# Patient Record
Sex: FEMALE | Race: WHITE | NOT HISPANIC OR LATINO | Employment: UNEMPLOYED | URBAN - METROPOLITAN AREA
[De-identification: names, ages, dates, MRNs, and addresses within clinical notes are randomized per-mention and may not be internally consistent; named-entity substitution may affect disease eponyms.]

---

## 2017-12-20 ENCOUNTER — OFFICE VISIT (OUTPATIENT)
Dept: URGENT CARE | Facility: CLINIC | Age: 27
End: 2017-12-20
Payer: COMMERCIAL

## 2017-12-20 VITALS
BODY MASS INDEX: 21.97 KG/M2 | HEART RATE: 89 BPM | OXYGEN SATURATION: 97 % | DIASTOLIC BLOOD PRESSURE: 74 MMHG | SYSTOLIC BLOOD PRESSURE: 116 MMHG | HEIGHT: 67 IN | RESPIRATION RATE: 18 BRPM | WEIGHT: 140 LBS | TEMPERATURE: 100 F

## 2017-12-20 DIAGNOSIS — J01.10 ACUTE FRONTAL SINUSITIS, RECURRENCE NOT SPECIFIED: ICD-10-CM

## 2017-12-20 DIAGNOSIS — J02.9 ACUTE PHARYNGITIS, UNSPECIFIED ETIOLOGY: ICD-10-CM

## 2017-12-20 DIAGNOSIS — J10.1 INFLUENZA A: ICD-10-CM

## 2017-12-20 DIAGNOSIS — J02.9 SORE THROAT: Primary | ICD-10-CM

## 2017-12-20 LAB
CTP QC/QA: YES
FLUAV AG NPH QL: POSITIVE
FLUBV AG NPH QL: NEGATIVE

## 2017-12-20 PROCEDURE — 87804 INFLUENZA ASSAY W/OPTIC: CPT | Mod: 59,QW,S$GLB, | Performed by: INTERNAL MEDICINE

## 2017-12-20 PROCEDURE — 99203 OFFICE O/P NEW LOW 30 MIN: CPT | Mod: S$GLB,,, | Performed by: INTERNAL MEDICINE

## 2017-12-20 RX ORDER — OSELTAMIVIR PHOSPHATE 75 MG/1
75 CAPSULE ORAL 2 TIMES DAILY
Qty: 10 CAPSULE | Refills: 0 | Status: SHIPPED | OUTPATIENT
Start: 2017-12-20 | End: 2017-12-25

## 2017-12-20 RX ORDER — PREDNISONE 10 MG/1
10 TABLET ORAL DAILY
Qty: 5 TABLET | Refills: 0 | Status: SHIPPED | OUTPATIENT
Start: 2017-12-20 | End: 2017-12-25

## 2017-12-20 RX ORDER — AMOXICILLIN AND CLAVULANATE POTASSIUM 875; 125 MG/1; MG/1
1 TABLET, FILM COATED ORAL EVERY 12 HOURS
Qty: 14 TABLET | Refills: 0 | Status: SHIPPED | OUTPATIENT
Start: 2017-12-20 | End: 2017-12-27

## 2017-12-21 NOTE — PROGRESS NOTES
"Subjective:       Patient ID: Isaias Anguiano is a 27 y.o. female.    Vitals:  height is 5' 7" (1.702 m) and weight is 63.5 kg (140 lb). Her oral temperature is 100.2 °F (37.9 °C). Her blood pressure is 116/74 and her pulse is 89. Her respiration is 18 and oxygen saturation is 97%.     Chief Complaint: Sore Throat    This is a 27 y.o. female with No past medical history on file.   who presents today with a chief complaint of Sore Throat. Patient was exposed to Flu.      Sore Throat    This is a new problem. The current episode started yesterday. The problem has been gradually worsening. The maximum temperature recorded prior to her arrival was 100.4 - 100.9 F. The pain is mild. Pertinent negatives include no abdominal pain, congestion, coughing, ear pain, headaches, hoarse voice or shortness of breath. She has tried nothing for the symptoms.     Review of Systems   Constitution: Positive for malaise/fatigue. Negative for chills and fever.   HENT: Positive for sore throat. Negative for congestion, ear pain and hoarse voice.    Eyes: Negative for discharge and redness.   Cardiovascular: Negative for chest pain, dyspnea on exertion and leg swelling.   Respiratory: Negative for cough, shortness of breath, sputum production and wheezing.    Musculoskeletal: Negative for myalgias.   Gastrointestinal: Negative for abdominal pain and nausea.   Neurological: Negative for headaches.       Objective:      Physical Exam   Constitutional: She is oriented to person, place, and time. She appears well-developed and well-nourished. She is cooperative.  Non-toxic appearance. She does not appear ill. No distress.   HENT:   Head: Normocephalic and atraumatic.   Right Ear: Hearing, external ear and ear canal normal. Tympanic membrane is injected.   Left Ear: Hearing, external ear and ear canal normal. Tympanic membrane is injected.   Nose: Mucosal edema and rhinorrhea present. No nasal deformity. No epistaxis. Right sinus exhibits frontal " sinus tenderness. Right sinus exhibits no maxillary sinus tenderness. Left sinus exhibits frontal sinus tenderness. Left sinus exhibits no maxillary sinus tenderness.   Mouth/Throat: Uvula is midline and mucous membranes are normal. No trismus in the jaw. Normal dentition. No uvula swelling. Posterior oropharyngeal erythema present.       Eyes: Conjunctivae and lids are normal. No scleral icterus.   Sclera clear bilat   Neck: Trachea normal, full passive range of motion without pain and phonation normal. Neck supple.   Cardiovascular: Normal rate, regular rhythm, normal heart sounds, intact distal pulses and normal pulses.    Pulmonary/Chest: Effort normal and breath sounds normal. No respiratory distress.   Abdominal: Soft. Normal appearance and bowel sounds are normal. She exhibits no distension. There is no tenderness.   Musculoskeletal: Normal range of motion. She exhibits no edema or deformity.   Neurological: She is alert and oriented to person, place, and time. She exhibits normal muscle tone. Coordination normal.   Skin: Skin is warm, dry and intact. She is not diaphoretic. No pallor.   Psychiatric: She has a normal mood and affect. Her speech is normal and behavior is normal. Judgment and thought content normal. Cognition and memory are normal.   Nursing note and vitals reviewed.      Assessment:       1. Sore throat    2. Acute frontal sinusitis, recurrence not specified    3. Acute pharyngitis, unspecified etiology        Plan:         Sore throat  -     POCT Influenza A/B  -     amoxicillin-clavulanate 875-125mg (AUGMENTIN) 875-125 mg per tablet; Take 1 tablet by mouth every 12 (twelve) hours.  Dispense: 14 tablet; Refill: 0  -     predniSONE (DELTASONE) 10 MG tablet; Take 1 tablet (10 mg total) by mouth once daily.  Dispense: 5 tablet; Refill: 0  -     oseltamivir (TAMIFLU) 75 MG capsule; Take 1 capsule (75 mg total) by mouth 2 (two) times daily.  Dispense: 10 capsule; Refill: 0    Acute frontal  sinusitis, recurrence not specified  -     amoxicillin-clavulanate 875-125mg (AUGMENTIN) 875-125 mg per tablet; Take 1 tablet by mouth every 12 (twelve) hours.  Dispense: 14 tablet; Refill: 0  -     predniSONE (DELTASONE) 10 MG tablet; Take 1 tablet (10 mg total) by mouth once daily.  Dispense: 5 tablet; Refill: 0  -     oseltamivir (TAMIFLU) 75 MG capsule; Take 1 capsule (75 mg total) by mouth 2 (two) times daily.  Dispense: 10 capsule; Refill: 0    Acute pharyngitis, unspecified etiology  -     amoxicillin-clavulanate 875-125mg (AUGMENTIN) 875-125 mg per tablet; Take 1 tablet by mouth every 12 (twelve) hours.  Dispense: 14 tablet; Refill: 0  -     predniSONE (DELTASONE) 10 MG tablet; Take 1 tablet (10 mg total) by mouth once daily.  Dispense: 5 tablet; Refill: 0  -     oseltamivir (TAMIFLU) 75 MG capsule; Take 1 capsule (75 mg total) by mouth 2 (two) times daily.  Dispense: 10 capsule; Refill: 0      Take meds

## 2017-12-21 NOTE — PATIENT INSTRUCTIONS
Acute Bacterial Rhinosinusitis (ABRS)    Acute bacterial rhinosinusitis (ABRS) is an infection of your nasal cavity and sinuses. Its caused by bacteria. Acute means that youve had symptoms for less than 12 weeks.  Understanding your sinuses  The nasal cavity is the large air-filled space behind your nose. The sinuses are a group of spaces formed by the bones of your face. They connect with your nasal cavity. ABRS causes the tissue lining these spaces to become inflamed. Mucus may not drain normally. This leads to facial pain and other symptoms.  What causes ABRS?  ABRS most often follows an upper respiratory infection caused by a virus. Bacteria then infect the lining of your nasal cavity and sinuses. But you can also get ABRS if you have:  · Nasal allergies  · Long-term nasal swelling and congestion not caused by allergies  · Blockage in the nose  Symptoms of ABRS  The symptoms of ABRS may be different for each person, and can include:  · Nasal congestion  · Runny nose  · Fluid draining from the nose down the throat (postnasal drip)  · Headache  · Cough  · Pain in the sinuses  · Thick, colored fluid from the nose (mucus)  · Fever  Diagnosing ABRS  ABRS may be diagnosed if youve had an upper respiratory infection like a cold and cough for longer than 10 to 14 days. Your health care provider will ask about your symptoms and your medical history. The provider will check your vital signs, including your temperature. Youll have a physical exam. The health care provider will check your ears, nose, and throat. You likely wont need any tests. If ABRS comes back, you may have a culture or other tests.  Treatment for ABRS  Treatment may include:  · Antibiotic medicine. This is for symptoms that last for at least 10 to 14 days.  · Nasal corticosteroid medicine. Drops or spray used in the nose can lessen swelling and congestion.  · Over-the-counter pain medicine. This is to lessen sinus pain and pressure.  · Nasal  decongestant medicine. Spray or drops may help to lessen congestion. Do not use them for more than a few days.  · Salt wash (saline irrigation). This can help to loosen mucus.  Possible complications of ABRS  ABRS may come back or become long-term (chronic).  In rare cases, ABRS may cause complications such as:   · Inflamed tissue around the brain and spinal cord (meningitis)  · Inflamed tissue around the eyes (orbital cellulitis)  · Inflamed bones around the sinuses (osteitis)  These problems may need to be treated in a hospital with intravenous (IV) antibiotic medicine or surgery.  When to call the health care provider  Call your health care provider if you have any of the following:  · Symptoms that dont get better, or get worse  · Symptoms that dont get better after 3 to 5 days on antibiotics  · Trouble seeing  · Swelling around your eyes  · Confusion or trouble staying awake   Date Last Reviewed: 3/3/2015  © 7518-6900 [employer]. [address] [place] [address]. All rights reserved. This information is not intended as a substitute for professional medical care. Always follow your healthcare professional's instructions.        Influenza (Adult)    Influenza is also called the flu. It is a viral illness that affects the air passages of your lungs. It is different from the common cold. The flu can easily be passed from one to person to another. It may be spread through the air by coughing and sneezing. Or it can be spread by touching the sick person and then touching your own eyes, nose, or mouth.  The flu starts 1 to 3 days after you are exposed to the flu virus. It may last for 1 to 2 weeks but many people feel tired or fatigued for many weeks afterward. You usually dont need to take antibiotics unless you have a complication. This might be an ear or sinus infection or pneumonia.  Symptoms of the flu may be mild or severe. They can include extreme tiredness (wanting to stay in bed all  day), chills, fevers, muscle aches, soreness with eye movement, headache, and a dry, hacking cough.  Home care  Follow these guidelines when caring for yourself at home:  · Avoid being around cigarette smoke, whether yours or other peoples.  · Acetaminophen or ibuprofen will help ease your fever, muscle aches, and headache. Dont give aspirin to anyone younger than 18 who has the flu. Aspirin can harm the liver.  · Nausea and loss of appetite are common with the flu. Eat light meals. Drink 6 to 8 glasses of liquids every day. Good choices are water, sport drinks, soft drinks without caffeine, juices, tea, and soup. Extra fluids will also help loosen secretions in your nose and lungs.  · Over-the-counter cold medicines will not make the flu go away faster. But the medicines may help with coughing, sore throat, and congestion in your nose and sinuses. Dont use a decongestant if you have high blood pressure.  · Stay home until your fever has been gone for at least 24 hours without using medicine to reduce fever.  Follow-up care  Follow up with your healthcare provider, or as advised, if you are not getting better over the next week.  If you are age 65 or older, talk with your provider about getting a pneumococcal vaccine every 5 years. You should also get this vaccine if you have chronic asthma or COPD. All adults should get a flu vaccine every fall. Ask your provider about this.  When to seek medical advice  Call your healthcare provider right away if any of these occur:  · Cough with lots of colored mucus (sputum) or blood in your mucus  · Chest pain, shortness of breath, wheezing, or trouble breathing  · Severe headache, or face, neck, or ear pain  · New rash with fever  · Fever of 100.4°F (38°C) or higher, or as directed by your healthcare provider  · Confusion, behavior change, or seizure  · Severe weakness or dizziness  · You get a new fever or cough after getting better for a few days  Date Last Reviewed:  1/1/2017 © 2000-2017 [employer]. [address] [place] [address], [place] [address]. All rights reserved. This information is not intended as a substitute for professional medical care. Always follow your healthcare professional's instructions.    Please return here or go to the Emergency Department for any concerns or worsening of condition.  If you were prescribed antibiotics, please take them to completion.  If you were prescribed a narcotic medication, do not drive or operate heavy equipment or machinery while taking these medications.  Please follow up with your primary care doctor or specialist as needed.    If you  smoke, please stop smoking.  1) Motrin/advil/ibuprofen- Take Two to Three Tablets(200 mg) three Times a Day for 5 to 7 Days.  2) Mucinex D 1/2 to 1 Tablet twice a day for 5 to 7 Days.  3) Drink Hot Liquids(coffee,WATER,Tea,Hot Chocolate,or Soup) that you put in a Mug place in Microwave for 2.5 to 3 minutes CHANGE THE CUP THAT WAS USED IN THE MICROWAVE SO AS NOT TO BURN YOUR MOUTH,then sniff the steam from the cup and sip the heated liquid TEN TO TWELVE TIMES A DAY for 5 to 7 Days.  4) These 3 things will help the antibiotics and other medications work faster and will speed your recovery!

## 2017-12-23 ENCOUNTER — TELEPHONE (OUTPATIENT)
Dept: URGENT CARE | Facility: CLINIC | Age: 27
End: 2017-12-23

## 2018-10-08 ENCOUNTER — OFFICE VISIT (OUTPATIENT)
Dept: URGENT CARE | Facility: CLINIC | Age: 28
End: 2018-10-08
Payer: COMMERCIAL

## 2018-10-08 VITALS
TEMPERATURE: 97 F | WEIGHT: 135 LBS | OXYGEN SATURATION: 96 % | HEART RATE: 82 BPM | SYSTOLIC BLOOD PRESSURE: 116 MMHG | HEIGHT: 67 IN | BODY MASS INDEX: 21.19 KG/M2 | RESPIRATION RATE: 16 BRPM | DIASTOLIC BLOOD PRESSURE: 75 MMHG

## 2018-10-08 DIAGNOSIS — J01.10 ACUTE NON-RECURRENT FRONTAL SINUSITIS: Primary | ICD-10-CM

## 2018-10-08 PROCEDURE — 3008F BODY MASS INDEX DOCD: CPT | Mod: CPTII,S$GLB,, | Performed by: INTERNAL MEDICINE

## 2018-10-08 PROCEDURE — 96372 THER/PROPH/DIAG INJ SC/IM: CPT | Mod: S$GLB,,, | Performed by: NURSE PRACTITIONER

## 2018-10-08 PROCEDURE — 99213 OFFICE O/P EST LOW 20 MIN: CPT | Mod: 25,S$GLB,, | Performed by: INTERNAL MEDICINE

## 2018-10-08 RX ORDER — AMOXICILLIN AND CLAVULANATE POTASSIUM 875; 125 MG/1; MG/1
1 TABLET, FILM COATED ORAL EVERY 12 HOURS
Qty: 10 TABLET | Refills: 0 | Status: SHIPPED | OUTPATIENT
Start: 2018-10-08 | End: 2018-10-13

## 2018-10-08 RX ORDER — BETAMETHASONE SODIUM PHOSPHATE AND BETAMETHASONE ACETATE 3; 3 MG/ML; MG/ML
6 INJECTION, SUSPENSION INTRA-ARTICULAR; INTRALESIONAL; INTRAMUSCULAR; SOFT TISSUE
Status: COMPLETED | OUTPATIENT
Start: 2018-10-08 | End: 2018-10-08

## 2018-10-08 RX ADMIN — BETAMETHASONE SODIUM PHOSPHATE AND BETAMETHASONE ACETATE 6 MG: 3; 3 INJECTION, SUSPENSION INTRA-ARTICULAR; INTRALESIONAL; INTRAMUSCULAR; SOFT TISSUE at 09:10

## 2018-10-08 NOTE — PROGRESS NOTES
"Subjective:       Patient ID: Isaias Anguiano is a 28 y.o. female.    Vitals:  height is 5' 7" (1.702 m) and weight is 61.2 kg (135 lb). Her oral temperature is 97.4 °F (36.3 °C). Her blood pressure is 116/75 and her pulse is 82. Her respiration is 16 and oxygen saturation is 96%.     Chief Complaint: Cough    Cough   This is a new problem. The current episode started in the past 7 days. The problem has been gradually worsening. The problem occurs constantly. The cough is productive of purulent sputum. Associated symptoms include postnasal drip and a sore throat. Pertinent negatives include no chest pain, chills, ear pain, eye redness, fever, headaches, myalgias, shortness of breath or wheezing. Nothing aggravates the symptoms. She has tried OTC cough suppressant for the symptoms. The treatment provided no relief.     Review of Systems   Constitution: Negative for chills, fever and malaise/fatigue.   HENT: Positive for postnasal drip and sore throat. Negative for congestion, ear pain and hoarse voice.    Eyes: Negative for discharge and redness.   Cardiovascular: Negative for chest pain, dyspnea on exertion and leg swelling.   Respiratory: Positive for cough and sputum production. Negative for shortness of breath and wheezing.    Musculoskeletal: Negative for myalgias.   Gastrointestinal: Negative for abdominal pain and nausea.   Neurological: Negative for headaches.       Objective:      Physical Exam   Constitutional: She is oriented to person, place, and time. She appears well-developed and well-nourished.   HENT:   Head: Normocephalic and atraumatic.   Right Ear: External ear normal.   Left Ear: External ear normal.   Nose: Nose normal.   Mouth/Throat: Oropharyngeal exudate present.   Thick pnd   Cardiovascular: Normal rate, regular rhythm and normal heart sounds.   Pulmonary/Chest: Effort normal and breath sounds normal.   Abdominal: Soft. Bowel sounds are normal. There is no tenderness.   Musculoskeletal: She " exhibits no edema.   Neurological: She is alert and oriented to person, place, and time.   Skin: Skin is warm and dry.   Psychiatric: She has a normal mood and affect. Her behavior is normal. Judgment and thought content normal.   Nursing note and vitals reviewed.      Assessment:       1. Acute non-recurrent frontal sinusitis        Plan:       1. Acute non-recurrent frontal sinusitis    - betamethasone acetate-betamethasone sodium phosphate injection 6 mg; Inject 1 mL (6 mg total) into the muscle one time.  - amoxicillin-clavulanate 875-125mg (AUGMENTIN) 875-125 mg per tablet; Take 1 tablet by mouth every 12 (twelve) hours. for 5 days  Dispense: 10 tablet; Refill: 0

## 2018-10-08 NOTE — PATIENT INSTRUCTIONS
Acute Sinusitis    Acute sinusitis is irritation and swelling of the sinuses. It is usually caused by a viral infection after a common cold. Your doctor can help you find relief.  What is acute sinusitis?  Sinuses are air-filled spaces in the skull behind the face. They are kept moist and clean by a lining of mucosa. Things such as pollen, smoke, and chemical fumes can irritate the mucosa. It can then swell up. As a response to irritation, the mucosa makes more mucus and other fluids. Tiny hairlike cilia cover the mucosa. Cilia help carry mucus toward the opening of the sinus. Too much mucus may cause the cilia to stop working. This blocks the sinus opening. A buildup of fluid in the sinuses then causes pain and pressure. It can also encourage bacteria to grow in the sinuses.  Common symptoms of acute sinusitis  You may have:  · Facial soreness pain  · Headache  · Fever  · Fluid draining in the back of the throat (postnasal drip)  · Congestion  · Drainage that is thick and colored, instead of clear  · Cough  Diagnosing acute sinusitis  Your doctor will ask about your symptoms and health history. He or she will look at your ear, nose, and throat. You usually won't need to have X-rays taken.    The doctor may take a sample of mucus to check for bacteria. If you have sinusitis that keeps coming back, you may need imaging tests such as X-rays or CAT scans. This will help your doctor check for a structural problem that may be causing the infection.  Treating acute sinusitis  Treatment is aimed at unblocking the sinus opening and helping the cilia work again. You may need to take antihistamine and decongestant medicine. These can reduce inflammation and decrease the amount of fluid your sinuses make. If you have a bacterial infection, you will need to take antibiotic medicine for 10 to 14 days. Take this medicine until it is gone, even if you feel better.  Date Last Reviewed: 10/1/2016  © 3788-0881 [employer] [address] [place] [address]. All rights reserved. This information is not intended as a substitute for professional medical care. Always follow your healthcare professional's instructions.

## 2019-02-05 ENCOUNTER — OFFICE VISIT (OUTPATIENT)
Dept: URGENT CARE | Facility: CLINIC | Age: 29
End: 2019-02-05
Payer: COMMERCIAL

## 2019-02-05 VITALS
WEIGHT: 150 LBS | RESPIRATION RATE: 16 BRPM | SYSTOLIC BLOOD PRESSURE: 102 MMHG | BODY MASS INDEX: 23.54 KG/M2 | OXYGEN SATURATION: 98 % | TEMPERATURE: 99 F | HEIGHT: 67 IN | HEART RATE: 87 BPM | DIASTOLIC BLOOD PRESSURE: 70 MMHG

## 2019-02-05 DIAGNOSIS — Z3A.18 18 WEEKS GESTATION OF PREGNANCY: Primary | ICD-10-CM

## 2019-02-05 DIAGNOSIS — J01.10 ACUTE NON-RECURRENT FRONTAL SINUSITIS: ICD-10-CM

## 2019-02-05 PROCEDURE — 3008F BODY MASS INDEX DOCD: CPT | Mod: CPTII,S$GLB,, | Performed by: NURSE PRACTITIONER

## 2019-02-05 PROCEDURE — 99213 PR OFFICE/OUTPT VISIT, EST, LEVL III, 20-29 MIN: ICD-10-PCS | Mod: S$GLB,,, | Performed by: NURSE PRACTITIONER

## 2019-02-05 PROCEDURE — 3008F PR BODY MASS INDEX (BMI) DOCUMENTED: ICD-10-PCS | Mod: CPTII,S$GLB,, | Performed by: NURSE PRACTITIONER

## 2019-02-05 PROCEDURE — 99213 OFFICE O/P EST LOW 20 MIN: CPT | Mod: S$GLB,,, | Performed by: NURSE PRACTITIONER

## 2019-02-05 RX ORDER — AZITHROMYCIN 250 MG/1
TABLET, FILM COATED ORAL
Qty: 6 TABLET | Refills: 0 | Status: SHIPPED | OUTPATIENT
Start: 2019-02-05 | End: 2019-06-03 | Stop reason: ALTCHOICE

## 2019-02-05 NOTE — PROGRESS NOTES
"Subjective:       Patient ID: Isaias Anguiano is a 28 y.o. female.    Vitals:  height is 5' 7" (1.702 m) and weight is 68 kg (150 lb). Her tympanic temperature is 99.1 °F (37.3 °C). Her blood pressure is 102/70 and her pulse is 87. Her respiration is 16 and oxygen saturation is 98%.     Chief Complaint: Cough    Cough   This is a new problem. The current episode started in the past 7 days. The problem has been gradually worsening. The problem occurs constantly. Associated symptoms include postnasal drip. Pertinent negatives include no chills, ear pain, eye redness, fever, headaches, myalgias, rash or sore throat. Nothing aggravates the symptoms. She has tried OTC cough suppressant for the symptoms. The treatment provided no relief.       Constitution: Negative for appetite change, chills and fever.   HENT: Positive for postnasal drip. Negative for ear pain, congestion and sore throat.    Neck: Negative for painful lymph nodes.   Eyes: Negative for eye discharge and eye redness.   Respiratory: Positive for cough.    Gastrointestinal: Negative for vomiting and diarrhea.   Genitourinary: Negative for dysuria.   Musculoskeletal: Negative for muscle ache.   Skin: Negative for rash.   Neurological: Negative for headaches and seizures.   Hematologic/Lymphatic: Negative for swollen lymph nodes.       Objective:      Physical Exam   Constitutional: She is oriented to person, place, and time. She appears well-developed and well-nourished.   HENT:   Head: Normocephalic and atraumatic.   Right Ear: External ear normal.   Left Ear: External ear normal.   Nose: Nose normal.   Mouth/Throat: Oropharyngeal exudate present.   Cardiovascular: Normal rate, regular rhythm and normal heart sounds.   Pulmonary/Chest: Effort normal and breath sounds normal.   Abdominal: Soft. Bowel sounds are normal. There is no tenderness.   Musculoskeletal: She exhibits no edema.   Neurological: She is alert and oriented to person, place, and time.   Skin: " Skin is warm and dry.   Psychiatric: She has a normal mood and affect. Her behavior is normal. Judgment and thought content normal.   Nursing note and vitals reviewed.      Assessment:       1. 18 weeks gestation of pregnancy    2. Acute non-recurrent frontal sinusitis        Plan:         1. 18 weeks gestation of pregnancy      2. Acute non-recurrent frontal sinusitis  Advised on nasal saline and tylenol.   - azithromycin (Z-MAYO) 250 MG tablet; Take 2 tablets by mouth x 1 for day 1 Then take 1 tablet by mouth daily for day 2 - 5  Dispense: 6 tablet; Refill: 0

## 2019-02-05 NOTE — PATIENT INSTRUCTIONS
Saline saline.     Sinusitis (Antibiotic Treatment)    The sinuses are air-filled spaces within the bones of the face. They connect to the inside of the nose. Sinusitis is an inflammation of the tissue lining the sinus cavity. Sinus inflammation can occur during a cold. It can also be due to allergies to pollens and other particles in the air. Sinusitis can cause symptoms of sinus congestion and fullness. A sinus infection causes fever, headache and facial pain. There is often green or yellow drainage from the nose or into the back of the throat (post-nasal drip). You have been given antibiotics to treat this condition.  Home care:  · Take the full course of antibiotics as instructed. Do not stop taking them, even if you feel better.  · Drink plenty of water, hot tea, and other liquids. This may help thin mucus. It also may promote sinus drainage.  · Heat may help soothe painful areas of the face. Use a towel soaked in hot water. Or,  the shower and direct the hot spray onto your face. Using a vaporizer along with a menthol rub at night may also help.   · An expectorant containing guaifenesin may help thin the mucus and promote drainage from the sinuses.  · Over-the-counter decongestants may be used unless a similar medicine was prescribed. Nasal sprays work the fastest. Use one that contains phenylephrine or oxymetazoline. First blow the nose gently. Then use the spray. Do not use these medicines more often than directed on the label or symptoms may get worse. You may also use tablets containing pseudoephedrine. Avoid products that combine ingredients, because side effects may be increased. Read labels. You can also ask the pharmacist for help. (NOTE: Persons with high blood pressure should not use decongestants. They can raise blood pressure.)  · Over-the-counter antihistamines may help if allergies contributed to your sinusitis.    · Do not use nasal rinses or irrigation during an acute sinus infection,  unless told to by your health care provider. Rinsing may spread the infection to other sinuses.  · Use acetaminophen or ibuprofen to control pain, unless another pain medicine was prescribed. (If you have chronic liver or kidney disease or ever had a stomach ulcer, talk with your doctor before using these medicines. Aspirin should never be used in anyone under 18 years of age who is ill with a fever. It may cause severe liver damage.)  · Don't smoke. This can worsen symptoms.  Follow-up care  Follow up with your healthcare provider or our staff if you are not improving within the next week.  When to seek medical advice  Call your healthcare provider if any of these occur:  · Facial pain or headache becoming more severe  · Stiff neck  · Unusual drowsiness or confusion  · Swelling of the forehead or eyelids  · Vision problems, including blurred or double vision  · Fever of 100.4ºF (38ºC) or higher, or as directed by your healthcare provider  · Seizure  · Breathing problems  · Symptoms not resolving within 10 days  Date Last Reviewed: 4/13/2015  © 6395-2317 [employer]. [address] [place] [address], [place] [address]. All rights reserved. This information is not intended as a substitute for professional medical care. Always follow your healthcare professional's instructions.

## 2019-02-08 ENCOUNTER — TELEPHONE (OUTPATIENT)
Dept: URGENT CARE | Facility: CLINIC | Age: 29
End: 2019-02-08

## 2019-06-03 ENCOUNTER — OFFICE VISIT (OUTPATIENT)
Dept: URGENT CARE | Facility: CLINIC | Age: 29
End: 2019-06-03
Payer: COMMERCIAL

## 2019-06-03 VITALS
DIASTOLIC BLOOD PRESSURE: 65 MMHG | OXYGEN SATURATION: 98 % | BODY MASS INDEX: 27.47 KG/M2 | HEART RATE: 91 BPM | SYSTOLIC BLOOD PRESSURE: 110 MMHG | WEIGHT: 175 LBS | RESPIRATION RATE: 16 BRPM | HEIGHT: 67 IN | TEMPERATURE: 98 F

## 2019-06-03 DIAGNOSIS — J01.10 ACUTE NON-RECURRENT FRONTAL SINUSITIS: Primary | ICD-10-CM

## 2019-06-03 DIAGNOSIS — Z3A.36 36 WEEKS GESTATION OF PREGNANCY: ICD-10-CM

## 2019-06-03 PROCEDURE — 3008F BODY MASS INDEX DOCD: CPT | Mod: CPTII,S$GLB,, | Performed by: NURSE PRACTITIONER

## 2019-06-03 PROCEDURE — 96372 THER/PROPH/DIAG INJ SC/IM: CPT | Mod: S$GLB,,, | Performed by: NURSE PRACTITIONER

## 2019-06-03 PROCEDURE — 96372 PR INJECTION,THERAP/PROPH/DIAG2ST, IM OR SUBCUT: ICD-10-PCS | Mod: S$GLB,,, | Performed by: NURSE PRACTITIONER

## 2019-06-03 PROCEDURE — 3008F PR BODY MASS INDEX (BMI) DOCUMENTED: ICD-10-PCS | Mod: CPTII,S$GLB,, | Performed by: NURSE PRACTITIONER

## 2019-06-03 PROCEDURE — 99213 PR OFFICE/OUTPT VISIT, EST, LEVL III, 20-29 MIN: ICD-10-PCS | Mod: 25,S$GLB,, | Performed by: NURSE PRACTITIONER

## 2019-06-03 PROCEDURE — 99213 OFFICE O/P EST LOW 20 MIN: CPT | Mod: 25,S$GLB,, | Performed by: NURSE PRACTITIONER

## 2019-06-03 RX ORDER — DEXAMETHASONE SODIUM PHOSPHATE 100 MG/10ML
5 INJECTION INTRAMUSCULAR; INTRAVENOUS
Status: COMPLETED | OUTPATIENT
Start: 2019-06-03 | End: 2019-06-03

## 2019-06-03 RX ORDER — AZITHROMYCIN 250 MG/1
TABLET, FILM COATED ORAL
Qty: 6 TABLET | Refills: 0 | Status: SHIPPED | OUTPATIENT
Start: 2019-06-03 | End: 2019-08-26 | Stop reason: ALTCHOICE

## 2019-06-03 RX ADMIN — DEXAMETHASONE SODIUM PHOSPHATE 5 MG: 100 INJECTION INTRAMUSCULAR; INTRAVENOUS at 10:06

## 2019-06-03 NOTE — PROGRESS NOTES
"Subjective:       Patient ID: Isaias Anguiano is a 29 y.o. female.    Vitals:  height is 5' 7" (1.702 m) and weight is 79.4 kg (175 lb). Her tympanic temperature is 98.2 °F (36.8 °C). Her blood pressure is 110/65 and her pulse is 91. Her respiration is 16 and oxygen saturation is 98%.     Chief Complaint: Cough    Cough   This is a new problem. The current episode started in the past 7 days. The problem has been gradually worsening. The cough is productive of purulent sputum. Associated symptoms include postnasal drip and a sore throat. Pertinent negatives include no chills, ear pain, eye redness, fever, hemoptysis, myalgias, rash, shortness of breath or wheezing. Nothing aggravates the symptoms. She has tried OTC cough suppressant for the symptoms. The treatment provided no relief.       Constitution: Negative for chills, sweating, fatigue and fever.   HENT: Positive for postnasal drip, sinus pain, sinus pressure and sore throat. Negative for ear pain, congestion and voice change.    Neck: Negative for painful lymph nodes.   Eyes: Negative for eye redness.   Respiratory: Positive for cough and sputum production. Negative for chest tightness, bloody sputum, COPD, shortness of breath, stridor, wheezing and asthma.    Gastrointestinal: Negative for nausea and vomiting.   Musculoskeletal: Negative for muscle ache.   Skin: Negative for rash.   Allergic/Immunologic: Negative for seasonal allergies and asthma.   Hematologic/Lymphatic: Negative for swollen lymph nodes.       Objective:      Physical Exam   Constitutional: She is oriented to person, place, and time. She appears well-developed and well-nourished.   HENT:   Head: Normocephalic and atraumatic.   Right Ear: External ear normal.   Left Ear: External ear normal.   Nose: Nose normal.   Mouth/Throat: Oropharyngeal exudate present.   Cardiovascular: Normal rate, regular rhythm and normal heart sounds.   Pulmonary/Chest: Effort normal and breath sounds normal.   Hacking " cough   Abdominal: Soft. Bowel sounds are normal. There is no tenderness.   Musculoskeletal: She exhibits no edema.   Neurological: She is alert and oriented to person, place, and time.   Skin: Skin is warm and dry.   Psychiatric: She has a normal mood and affect. Her behavior is normal. Judgment and thought content normal.   Nursing note and vitals reviewed.      Assessment:       1. Acute non-recurrent frontal sinusitis    2. 36 weeks gestation of pregnancy        Plan:         1. Acute non-recurrent frontal sinusitis/pregnancy 36 weeks.   Advised on risk vs benefit. She request shot, will hold a/b until end of week.   - azithromycin (Z-MAYO) 250 MG tablet; Take 2 tablets by mouth x 1 for day 1 Then take 1 tablet by mouth daily for day 2 - 5  Dispense: 6 tablet; Refill: 0  - dexamethasone injection 5 mg

## 2019-06-03 NOTE — PATIENT INSTRUCTIONS
Sinusitis (Antibiotic Treatment)    The sinuses are air-filled spaces within the bones of the face. They connect to the inside of the nose. Sinusitis is an inflammation of the tissue lining the sinus cavity. Sinus inflammation can occur during a cold. It can also be due to allergies to pollens and other particles in the air. Sinusitis can cause symptoms of sinus congestion and fullness. A sinus infection causes fever, headache and facial pain. There is often green or yellow drainage from the nose or into the back of the throat (post-nasal drip). You have been given antibiotics to treat this condition.  Home care:  · Take the full course of antibiotics as instructed. Do not stop taking them, even if you feel better.  · Drink plenty of water, hot tea, and other liquids. This may help thin mucus. It also may promote sinus drainage.  · Heat may help soothe painful areas of the face. Use a towel soaked in hot water. Or,  the shower and direct the hot spray onto your face. Using a vaporizer along with a menthol rub at night may also help.   · An expectorant containing guaifenesin may help thin the mucus and promote drainage from the sinuses.  · Over-the-counter decongestants may be used unless a similar medicine was prescribed. Nasal sprays work the fastest. Use one that contains phenylephrine or oxymetazoline. First blow the nose gently. Then use the spray. Do not use these medicines more often than directed on the label or symptoms may get worse. You may also use tablets containing pseudoephedrine. Avoid products that combine ingredients, because side effects may be increased. Read labels. You can also ask the pharmacist for help. (NOTE: Persons with high blood pressure should not use decongestants. They can raise blood pressure.)  · Over-the-counter antihistamines may help if allergies contributed to your sinusitis.    · Do not use nasal rinses or irrigation during an acute sinus infection, unless told to by  your health care provider. Rinsing may spread the infection to other sinuses.  · Use acetaminophen or ibuprofen to control pain, unless another pain medicine was prescribed. (If you have chronic liver or kidney disease or ever had a stomach ulcer, talk with your doctor before using these medicines. Aspirin should never be used in anyone under 18 years of age who is ill with a fever. It may cause severe liver damage.)  · Don't smoke. This can worsen symptoms.  Follow-up care  Follow up with your healthcare provider or our staff if you are not improving within the next week.  When to seek medical advice  Call your healthcare provider if any of these occur:  · Facial pain or headache becoming more severe  · Stiff neck  · Unusual drowsiness or confusion  · Swelling of the forehead or eyelids  · Vision problems, including blurred or double vision  · Fever of 100.4ºF (38ºC) or higher, or as directed by your healthcare provider  · Seizure  · Breathing problems  · Symptoms not resolving within 10 days  Date Last Reviewed: 4/13/2015  © 7341-4895 [employer]. [address] [place] [address], [place] [address]. All rights reserved. This information is not intended as a substitute for professional medical care. Always follow your healthcare professional's instructions.

## 2019-08-26 ENCOUNTER — OFFICE VISIT (OUTPATIENT)
Dept: URGENT CARE | Facility: CLINIC | Age: 29
End: 2019-08-26
Payer: COMMERCIAL

## 2019-08-26 VITALS
SYSTOLIC BLOOD PRESSURE: 130 MMHG | WEIGHT: 160 LBS | HEIGHT: 67 IN | OXYGEN SATURATION: 99 % | DIASTOLIC BLOOD PRESSURE: 81 MMHG | TEMPERATURE: 98 F | HEART RATE: 74 BPM | RESPIRATION RATE: 16 BRPM | BODY MASS INDEX: 25.11 KG/M2

## 2019-08-26 DIAGNOSIS — Z78.9 BREASTFEEDING (INFANT): ICD-10-CM

## 2019-08-26 DIAGNOSIS — J01.10 ACUTE NON-RECURRENT FRONTAL SINUSITIS: Primary | ICD-10-CM

## 2019-08-26 PROCEDURE — 96372 THER/PROPH/DIAG INJ SC/IM: CPT | Mod: S$GLB,,, | Performed by: NURSE PRACTITIONER

## 2019-08-26 PROCEDURE — 96372 PR INJECTION,THERAP/PROPH/DIAG2ST, IM OR SUBCUT: ICD-10-PCS | Mod: S$GLB,,, | Performed by: NURSE PRACTITIONER

## 2019-08-26 PROCEDURE — 3008F BODY MASS INDEX DOCD: CPT | Mod: CPTII,S$GLB,, | Performed by: NURSE PRACTITIONER

## 2019-08-26 PROCEDURE — 99213 OFFICE O/P EST LOW 20 MIN: CPT | Mod: 25,S$GLB,, | Performed by: NURSE PRACTITIONER

## 2019-08-26 PROCEDURE — 99213 PR OFFICE/OUTPT VISIT, EST, LEVL III, 20-29 MIN: ICD-10-PCS | Mod: 25,S$GLB,, | Performed by: NURSE PRACTITIONER

## 2019-08-26 PROCEDURE — 3008F PR BODY MASS INDEX (BMI) DOCUMENTED: ICD-10-PCS | Mod: CPTII,S$GLB,, | Performed by: NURSE PRACTITIONER

## 2019-08-26 RX ORDER — DEXAMETHASONE SODIUM PHOSPHATE 4 MG/ML
4 INJECTION, SOLUTION INTRA-ARTICULAR; INTRALESIONAL; INTRAMUSCULAR; INTRAVENOUS; SOFT TISSUE
Status: COMPLETED | OUTPATIENT
Start: 2019-08-26 | End: 2019-08-26

## 2019-08-26 RX ORDER — AZITHROMYCIN 250 MG/1
TABLET, FILM COATED ORAL
Qty: 6 TABLET | Refills: 0 | Status: SHIPPED | OUTPATIENT
Start: 2019-08-26 | End: 2023-03-31 | Stop reason: ALTCHOICE

## 2019-08-26 RX ADMIN — DEXAMETHASONE SODIUM PHOSPHATE 4 MG: 4 INJECTION, SOLUTION INTRA-ARTICULAR; INTRALESIONAL; INTRAMUSCULAR; INTRAVENOUS; SOFT TISSUE at 10:08

## 2019-08-26 NOTE — PATIENT INSTRUCTIONS
Sinusitis (Antibiotic Treatment)    The sinuses are air-filled spaces within the bones of the face. They connect to the inside of the nose. Sinusitis is an inflammation of the tissue lining the sinus cavity. Sinus inflammation can occur during a cold. It can also be due to allergies to pollens and other particles in the air. Sinusitis can cause symptoms of sinus congestion and fullness. A sinus infection causes fever, headache and facial pain. There is often green or yellow drainage from the nose or into the back of the throat (post-nasal drip). You have been given antibiotics to treat this condition.  Home care:  · Take the full course of antibiotics as instructed. Do not stop taking them, even if you feel better.  · Drink plenty of water, hot tea, and other liquids. This may help thin mucus. It also may promote sinus drainage.  · Heat may help soothe painful areas of the face. Use a towel soaked in hot water. Or,  the shower and direct the hot spray onto your face. Using a vaporizer along with a menthol rub at night may also help.   · An expectorant containing guaifenesin may help thin the mucus and promote drainage from the sinuses.  · Over-the-counter decongestants may be used unless a similar medicine was prescribed. Nasal sprays work the fastest. Use one that contains phenylephrine or oxymetazoline. First blow the nose gently. Then use the spray. Do not use these medicines more often than directed on the label or symptoms may get worse. You may also use tablets containing pseudoephedrine. Avoid products that combine ingredients, because side effects may be increased. Read labels. You can also ask the pharmacist for help. (NOTE: Persons with high blood pressure should not use decongestants. They can raise blood pressure.)  · Over-the-counter antihistamines may help if allergies contributed to your sinusitis.    · Do not use nasal rinses or irrigation during an acute sinus infection, unless told to by  your health care provider. Rinsing may spread the infection to other sinuses.  · Use acetaminophen or ibuprofen to control pain, unless another pain medicine was prescribed. (If you have chronic liver or kidney disease or ever had a stomach ulcer, talk with your doctor before using these medicines. Aspirin should never be used in anyone under 18 years of age who is ill with a fever. It may cause severe liver damage.)  · Don't smoke. This can worsen symptoms.  Follow-up care  Follow up with your healthcare provider or our staff if you are not improving within the next week.  When to seek medical advice  Call your healthcare provider if any of these occur:  · Facial pain or headache becoming more severe  · Stiff neck  · Unusual drowsiness or confusion  · Swelling of the forehead or eyelids  · Vision problems, including blurred or double vision  · Fever of 100.4ºF (38ºC) or higher, or as directed by your healthcare provider  · Seizure  · Breathing problems  · Symptoms not resolving within 10 days  Date Last Reviewed: 4/13/2015  © 1030-3366 [employer]. [address] [place] [address], [place] [address]. All rights reserved. This information is not intended as a substitute for professional medical care. Always follow your healthcare professional's instructions.

## 2019-08-26 NOTE — PROGRESS NOTES
"Subjective:       Patient ID: Isiaas Anguiano is a 29 y.o. female.    Vitals:  height is 5' 7" (1.702 m) and weight is 72.6 kg (160 lb). Her tympanic temperature is 98.4 °F (36.9 °C). Her blood pressure is 130/81 and her pulse is 74. Her respiration is 16 and oxygen saturation is 99%.     Chief Complaint: Cough    Cough   This is a new problem. The current episode started in the past 7 days. The problem has been gradually worsening. The problem occurs constantly. The cough is productive of purulent sputum. Associated symptoms include postnasal drip and a sore throat. Pertinent negatives include no chills, ear pain, eye redness, fever, hemoptysis, myalgias, rash, shortness of breath or wheezing. Nothing aggravates the symptoms. Treatments tried: mucinex. The treatment provided no relief.       Constitution: Negative for chills, sweating, fatigue and fever.   HENT: Positive for postnasal drip, sinus pressure and sore throat. Negative for ear pain, congestion and voice change.    Neck: Negative for painful lymph nodes.   Eyes: Negative for eye redness.   Respiratory: Positive for cough and sputum production. Negative for chest tightness, bloody sputum, COPD, shortness of breath, stridor, wheezing and asthma.    Gastrointestinal: Negative for nausea and vomiting.   Musculoskeletal: Negative for muscle ache.   Skin: Negative for rash.   Allergic/Immunologic: Negative for seasonal allergies and asthma.   Hematologic/Lymphatic: Negative for swollen lymph nodes.       Objective:      Physical Exam   Constitutional: She is oriented to person, place, and time. She appears well-developed and well-nourished.   HENT:   Head: Normocephalic and atraumatic.   Right Ear: External ear normal.   Left Ear: External ear normal.   Nose: Nose normal.   Mouth/Throat: Oropharyngeal exudate present.   Thick pnd, frontal pressure   Cardiovascular: Normal rate, regular rhythm and normal heart sounds.   Pulmonary/Chest: Effort normal and breath " sounds normal.   Abdominal: Soft. Bowel sounds are normal. There is no tenderness.   Musculoskeletal: She exhibits no edema.   Neurological: She is alert and oriented to person, place, and time.   Skin: Skin is warm and dry.   Psychiatric: She has a normal mood and affect. Her behavior is normal. Judgment and thought content normal.   Nursing note and vitals reviewed.      Assessment:       1. Acute non-recurrent frontal sinusitis    2. Breastfeeding (infant)        Plan:       1. Acute non-recurrent frontal sinusitis  Has been a week. Pt is currently struggling taking care of infant and other 2 children and begging for steroid shot. Precautions discussed, agreeable to low dose steroid.   - azithromycin (Z-MAYO) 250 MG tablet; Take 2 tablets by mouth x 1 for day 1 Then take 1 tablet by mouth daily for day 2 - 5  Dispense: 6 tablet; Refill: 0  - dexamethasone injection 4 mg    2. Breastfeeding (infant)  Ok in breastfeeding.

## 2020-09-03 ENCOUNTER — OFFICE VISIT (OUTPATIENT)
Dept: URGENT CARE | Facility: CLINIC | Age: 30
End: 2020-09-03
Payer: COMMERCIAL

## 2020-09-03 VITALS
BODY MASS INDEX: 21.97 KG/M2 | WEIGHT: 140 LBS | HEART RATE: 57 BPM | DIASTOLIC BLOOD PRESSURE: 86 MMHG | HEIGHT: 67 IN | RESPIRATION RATE: 18 BRPM | OXYGEN SATURATION: 100 % | SYSTOLIC BLOOD PRESSURE: 121 MMHG | TEMPERATURE: 98 F

## 2020-09-03 DIAGNOSIS — N30.00 ACUTE CYSTITIS WITHOUT HEMATURIA: Primary | ICD-10-CM

## 2020-09-03 LAB
BILIRUB UR QL STRIP: NEGATIVE
GLUCOSE UR QL STRIP: NEGATIVE
KETONES UR QL STRIP: NEGATIVE
LEUKOCYTE ESTERASE UR QL STRIP: NEGATIVE
PH, POC UA: 6 (ref 5–8)
POC BLOOD, URINE: NEGATIVE
POC NITRATES, URINE: NEGATIVE
PROT UR QL STRIP: NEGATIVE
SP GR UR STRIP: 1.02 (ref 1–1.03)
UROBILINOGEN UR STRIP-ACNC: NORMAL (ref 0.1–1.1)

## 2020-09-03 PROCEDURE — 81003 URINALYSIS AUTO W/O SCOPE: CPT | Mod: QW,S$GLB,, | Performed by: NURSE PRACTITIONER

## 2020-09-03 PROCEDURE — 99214 OFFICE O/P EST MOD 30 MIN: CPT | Mod: 25,S$GLB,, | Performed by: NURSE PRACTITIONER

## 2020-09-03 PROCEDURE — 99214 PR OFFICE/OUTPT VISIT, EST, LEVL IV, 30-39 MIN: ICD-10-PCS | Mod: 25,S$GLB,, | Performed by: NURSE PRACTITIONER

## 2020-09-03 PROCEDURE — 81003 POCT URINALYSIS, DIPSTICK, AUTOMATED, W/O SCOPE: ICD-10-PCS | Mod: QW,S$GLB,, | Performed by: NURSE PRACTITIONER

## 2020-09-03 RX ORDER — PHENAZOPYRIDINE HYDROCHLORIDE 200 MG/1
200 TABLET, FILM COATED ORAL 3 TIMES DAILY PRN
Qty: 6 TABLET | Refills: 0 | Status: SHIPPED | OUTPATIENT
Start: 2020-09-03 | End: 2020-09-05

## 2020-09-03 RX ORDER — NITROFURANTOIN 25; 75 MG/1; MG/1
100 CAPSULE ORAL 2 TIMES DAILY
Qty: 10 CAPSULE | Refills: 0 | Status: SHIPPED | OUTPATIENT
Start: 2020-09-03 | End: 2020-09-08

## 2020-09-03 NOTE — PROGRESS NOTES
"Subjective:       Patient ID: Isaias Anguiano is a 30 y.o. female.    Vitals:  height is 5' 7" (1.702 m) and weight is 63.5 kg (140 lb). Her temperature is 98.3 °F (36.8 °C). Her blood pressure is 121/86 and her pulse is 57 (abnormal). Her respiration is 18 and oxygen saturation is 100%.     Chief Complaint: Urinary Tract Infection    Urinary Tract Infection   This is a new problem. The current episode started in the past 7 days. The problem occurs intermittently. Quality: pressure. The pain is at a severity of 2/10. The pain is mild. There has been no fever. She is sexually active. There is no history of pyelonephritis. Associated symptoms include frequency and urgency. Pertinent negatives include no chills, hematuria, nausea, vomiting or rash. She has tried NSAIDs (cranberry) for the symptoms. The treatment provided no relief. Her past medical history is significant for recurrent UTIs.       Constitution: Negative for chills and fever.   Neck: Negative for painful lymph nodes.   Gastrointestinal: Negative for abdominal pain, nausea and vomiting.   Genitourinary: Positive for frequency and urgency. Negative for dysuria, urine decreased, hematuria, history of kidney stones, painful menstruation, irregular menstruation, missed menses, heavy menstrual bleeding, ovarian cysts, genital trauma, vaginal pain, vaginal discharge, vaginal bleeding, vaginal odor, painful intercourse, genital sore, painful ejaculation and pelvic pain.   Musculoskeletal: Negative for back pain.   Skin: Negative for rash and lesion.   Hematologic/Lymphatic: Negative for swollen lymph nodes.       Objective:      Physical Exam   Constitutional:  Non-toxic appearance. She does not appear ill. No distress.   HENT:   Ears:   Right Ear: External ear normal.   Left Ear: External ear normal.   Eyes: No scleral icterus.   Neck: Normal range of motion.   Cardiovascular: Normal rate, regular rhythm, normal heart sounds and normal pulses.   Pulmonary/Chest: " "Effort normal and breath sounds normal.   Abdominal: Normal appearance. She exhibits no distension. There is no abdominal tenderness. There is no rebound, no guarding, no left CVA tenderness and no right CVA tenderness.   Neurological: She is alert.   Skin: Skin is not diaphoretic.   Nursing note and vitals reviewed.        Assessment:       1. Acute cystitis without hematuria        Plan:         Acute cystitis without hematuria  -     POCT Urinalysis, Dipstick, Automated, W/O Scope  -     nitrofurantoin, macrocrystal-monohydrate, (MACROBID) 100 MG capsule; Take 1 capsule (100 mg total) by mouth 2 (two) times daily. for 5 days  Dispense: 10 capsule; Refill: 0  -     phenazopyridine (PYRIDIUM) 200 MG tablet; Take 1 tablet (200 mg total) by mouth 3 (three) times daily as needed for Pain.  Dispense: 6 tablet; Refill: 0      Results for orders placed or performed in visit on 09/03/20   POCT Urinalysis, Dipstick, Automated, W/O Scope   Result Value Ref Range    POC Blood, Urine Negative Negative    POC Bilirubin, Urine Negative Negative    POC Urobilinogen, Urine Normal 0.1 - 1.1    POC Ketones, Urine Negative Negative    POC Protein, Urine Negative Negative    POC Nitrates, Urine Negative Negative    POC Glucose, Urine Negative Negative    pH, UA 6.0 5 - 8    POC Specific Gravity, Urine 1.020 1.003 - 1.029    POC Leukocytes, Urine Negative (A) Negative       Patient Instructions   *Urinary Tract Infections*  1) Avoid tub baths.  2) Always urinate after intercourse(Teens/Adults).  3) Avoid "Fizzy" drinks/soda drinks.  4) Always wipe from front to back.  5) Wear only cotton underwear.  6) Drink a lot of fluids (at least 8-10 glasses of water) for 5 to 7 days to help flush your kidneys. You can also drink 1 shot-sized glass of cranberry juice 3X daily over the next several days to help cleanse your bladder, but studies show that cranberry juice does not cure or prevent a UTI.   7) Take all medications as directed. Make " sure to complete all antibiotics as prescribed.    8) For patients above 6 months of age who are not allergic to and are not on anticoagulants, you can alternate Tylenol and Motrin every 4-6 hours for fever above 100.4F and/or pain.  For patients less than 6 months of age, allergic to or intolerant to NSAIDS, have gastritis, gastric ulcers, or history of GI bleeds, are pregnant, or are on anticoagulant therapy, you can take Tylenol every 4 hours as needed for fever above 100.4F and/or pain.   9) You should schedule a follow-up appointment with your Primary Care Provider/Pediatrician for recheck in 2-3 days or as directed at this visit.   10) If your condition fails to improve in a timely manner, you should receive another evaluation by your Primary Care Provider/Pediatrician to discuss your concerns or return to urgent care for a recheck.  If your condition worsens at any time, you should report immediately to your nearest Emergency Department for further evaluation. **You must understand that you have received Urgent Care treatment only and that you may be released before all of your medical problems are known or treated. You, the patient, are responsible to arrange for follow-up care as instructed.

## 2020-09-08 ENCOUNTER — TELEPHONE (OUTPATIENT)
Dept: URGENT CARE | Facility: CLINIC | Age: 30
End: 2020-09-08

## 2020-09-08 DIAGNOSIS — N39.0 E-COLI UTI: Primary | ICD-10-CM

## 2020-09-08 DIAGNOSIS — B96.20 E-COLI UTI: Primary | ICD-10-CM

## 2020-09-08 NOTE — TELEPHONE ENCOUNTER
Discussed with pt that she didn't receive much relief with pyridium. U/a is also flagging positive, but giving negative result??? Unsure why this is such, but I advised pt that she should have repeat urine done. Urine culture with no prelim at [facility] avail.

## 2020-09-08 NOTE — TELEPHONE ENCOUNTER
----- Message from Lola Downing MA sent at 9/8/2020  9:53 AM CDT -----  Patient was seen on 09/03 for a UTI.  She finished taking her meds today and still feels like she has a UTI.  Patient is asking about calling in some more medication.  Good phone # is [phone].

## 2020-09-11 LAB
BACTERIA UR CULT: ABNORMAL
BACTERIA UR CULT: ABNORMAL
OTHER ANTIBIOTIC SUSC ISLT: ABNORMAL

## 2020-09-11 RX ORDER — SULFAMETHOXAZOLE AND TRIMETHOPRIM 800; 160 MG/1; MG/1
1 TABLET ORAL 2 TIMES DAILY
Qty: 14 TABLET | Refills: 0 | Status: SHIPPED | OUTPATIENT
Start: 2020-09-11 | End: 2020-09-18

## 2020-09-11 RX ORDER — PHENAZOPYRIDINE HYDROCHLORIDE 200 MG/1
200 TABLET, FILM COATED ORAL
Qty: 6 TABLET | Refills: 0 | Status: SHIPPED | OUTPATIENT
Start: 2020-09-11 | End: 2023-03-31 | Stop reason: ALTCHOICE

## 2021-05-03 ENCOUNTER — OFFICE VISIT (OUTPATIENT)
Dept: URGENT CARE | Facility: CLINIC | Age: 31
End: 2021-05-03
Payer: COMMERCIAL

## 2021-05-03 VITALS
SYSTOLIC BLOOD PRESSURE: 119 MMHG | TEMPERATURE: 98 F | RESPIRATION RATE: 14 BRPM | DIASTOLIC BLOOD PRESSURE: 65 MMHG | OXYGEN SATURATION: 98 % | BODY MASS INDEX: 23.54 KG/M2 | HEIGHT: 67 IN | HEART RATE: 76 BPM | WEIGHT: 150 LBS

## 2021-05-03 DIAGNOSIS — J06.9 VIRAL URI: Primary | ICD-10-CM

## 2021-05-03 PROCEDURE — 3008F BODY MASS INDEX DOCD: CPT | Mod: CPTII,S$GLB,, | Performed by: NURSE PRACTITIONER

## 2021-05-03 PROCEDURE — 99214 OFFICE O/P EST MOD 30 MIN: CPT | Mod: S$GLB,,, | Performed by: NURSE PRACTITIONER

## 2021-05-03 PROCEDURE — 99214 PR OFFICE/OUTPT VISIT, EST, LEVL IV, 30-39 MIN: ICD-10-PCS | Mod: S$GLB,,, | Performed by: NURSE PRACTITIONER

## 2021-05-03 PROCEDURE — 3008F PR BODY MASS INDEX (BMI) DOCUMENTED: ICD-10-PCS | Mod: CPTII,S$GLB,, | Performed by: NURSE PRACTITIONER

## 2021-05-03 RX ORDER — PREDNISONE 20 MG/1
20 TABLET ORAL DAILY
Qty: 5 TABLET | Refills: 0 | Status: SHIPPED | OUTPATIENT
Start: 2021-05-03 | End: 2021-05-08

## 2021-05-10 ENCOUNTER — PATIENT MESSAGE (OUTPATIENT)
Dept: RESEARCH | Facility: HOSPITAL | Age: 31
End: 2021-05-10

## 2022-02-08 ENCOUNTER — OFFICE VISIT (OUTPATIENT)
Dept: URGENT CARE | Facility: CLINIC | Age: 32
End: 2022-02-08
Payer: COMMERCIAL

## 2022-02-08 VITALS
HEART RATE: 76 BPM | OXYGEN SATURATION: 98 % | SYSTOLIC BLOOD PRESSURE: 128 MMHG | RESPIRATION RATE: 15 BRPM | TEMPERATURE: 97 F | HEIGHT: 67 IN | DIASTOLIC BLOOD PRESSURE: 63 MMHG | WEIGHT: 150 LBS | BODY MASS INDEX: 23.54 KG/M2

## 2022-02-08 DIAGNOSIS — J01.90 ACUTE BACTERIAL SINUSITIS: Primary | ICD-10-CM

## 2022-02-08 DIAGNOSIS — B96.89 ACUTE BACTERIAL SINUSITIS: Primary | ICD-10-CM

## 2022-02-08 PROCEDURE — 1159F MED LIST DOCD IN RCRD: CPT | Mod: CPTII,S$GLB,, | Performed by: PHYSICIAN ASSISTANT

## 2022-02-08 PROCEDURE — 1159F PR MEDICATION LIST DOCUMENTED IN MEDICAL RECORD: ICD-10-PCS | Mod: CPTII,S$GLB,, | Performed by: PHYSICIAN ASSISTANT

## 2022-02-08 PROCEDURE — 1160F PR REVIEW ALL MEDS BY PRESCRIBER/CLIN PHARMACIST DOCUMENTED: ICD-10-PCS | Mod: CPTII,S$GLB,, | Performed by: PHYSICIAN ASSISTANT

## 2022-02-08 PROCEDURE — 3074F SYST BP LT 130 MM HG: CPT | Mod: CPTII,S$GLB,, | Performed by: PHYSICIAN ASSISTANT

## 2022-02-08 PROCEDURE — 3074F PR MOST RECENT SYSTOLIC BLOOD PRESSURE < 130 MM HG: ICD-10-PCS | Mod: CPTII,S$GLB,, | Performed by: PHYSICIAN ASSISTANT

## 2022-02-08 PROCEDURE — 99214 OFFICE O/P EST MOD 30 MIN: CPT | Mod: S$GLB,,, | Performed by: PHYSICIAN ASSISTANT

## 2022-02-08 PROCEDURE — 1160F RVW MEDS BY RX/DR IN RCRD: CPT | Mod: CPTII,S$GLB,, | Performed by: PHYSICIAN ASSISTANT

## 2022-02-08 PROCEDURE — 3078F PR MOST RECENT DIASTOLIC BLOOD PRESSURE < 80 MM HG: ICD-10-PCS | Mod: CPTII,S$GLB,, | Performed by: PHYSICIAN ASSISTANT

## 2022-02-08 PROCEDURE — 3008F BODY MASS INDEX DOCD: CPT | Mod: CPTII,S$GLB,, | Performed by: PHYSICIAN ASSISTANT

## 2022-02-08 PROCEDURE — 3078F DIAST BP <80 MM HG: CPT | Mod: CPTII,S$GLB,, | Performed by: PHYSICIAN ASSISTANT

## 2022-02-08 PROCEDURE — 3008F PR BODY MASS INDEX (BMI) DOCUMENTED: ICD-10-PCS | Mod: CPTII,S$GLB,, | Performed by: PHYSICIAN ASSISTANT

## 2022-02-08 PROCEDURE — 99214 PR OFFICE/OUTPT VISIT, EST, LEVL IV, 30-39 MIN: ICD-10-PCS | Mod: S$GLB,,, | Performed by: PHYSICIAN ASSISTANT

## 2022-02-08 RX ORDER — AMOXICILLIN AND CLAVULANATE POTASSIUM 875; 125 MG/1; MG/1
1 TABLET, FILM COATED ORAL EVERY 12 HOURS
Qty: 14 TABLET | Refills: 0 | Status: SHIPPED | OUTPATIENT
Start: 2022-02-08 | End: 2022-02-15

## 2022-02-08 RX ORDER — PROMETHAZINE HYDROCHLORIDE AND DEXTROMETHORPHAN HYDROBROMIDE 6.25; 15 MG/5ML; MG/5ML
5 SYRUP ORAL EVERY 4 HOURS PRN
Qty: 180 ML | Refills: 0 | Status: SHIPPED | OUTPATIENT
Start: 2022-02-08 | End: 2022-02-18

## 2022-02-08 RX ORDER — FLUTICASONE PROPIONATE 50 MCG
1 SPRAY, SUSPENSION (ML) NASAL 2 TIMES DAILY PRN
Qty: 15 G | Refills: 0 | Status: SHIPPED | OUTPATIENT
Start: 2022-02-08 | End: 2023-03-31 | Stop reason: ALTCHOICE

## 2022-02-08 NOTE — PROGRESS NOTES
"Subjective:       Patient ID: Isaias Anguiano is a 31 y.o. female.    Vitals:  height is 5' 7" (1.702 m) and weight is 68 kg (150 lb). Her tympanic temperature is 97.2 °F (36.2 °C). Her blood pressure is 128/63 and her pulse is 76. Her respiration is 15 and oxygen saturation is 98%.     Chief Complaint: Nasal Congestion    Other  This is a new problem. Episode onset: 1-. The problem occurs constantly. The problem has been gradually worsening. Associated symptoms include congestion and coughing. Pertinent negatives include no abdominal pain, anorexia, arthralgias, change in bowel habit, chest pain, chills, diaphoresis, fatigue, fever, headaches, joint swelling, myalgias, nausea, neck pain, numbness, rash, sore throat, swollen glands, urinary symptoms, vertigo, visual change, vomiting or weakness. Nothing aggravates the symptoms. She has tried acetaminophen for the symptoms. The treatment provided no relief.       Constitution: Negative for chills, sweating, fatigue and fever.   HENT: Positive for congestion. Negative for sore throat.    Neck: Negative for neck pain.   Cardiovascular: Negative for chest pain.   Respiratory: Positive for cough.    Gastrointestinal: Negative for abdominal pain, nausea and vomiting.   Musculoskeletal: Negative for joint pain, joint swelling and muscle ache.   Skin: Negative for rash.   Neurological: Negative for history of vertigo, headaches and numbness.       Objective:      Physical Exam   Constitutional: She is oriented to person, place, and time. She appears well-developed. She is cooperative.  Non-toxic appearance. She does not appear ill. No distress.   HENT:   Head: Normocephalic and atraumatic.   Ears:   Right Ear: Hearing, external ear and ear canal normal. impacted cerumen  Left Ear: Hearing, external ear and ear canal normal. impacted cerumen  Nose: Rhinorrhea and congestion present.   Mouth/Throat: Mucous membranes are moist. No oropharyngeal exudate or posterior " oropharyngeal erythema. Oropharynx is clear.   Eyes: Conjunctivae and lids are normal. No scleral icterus.   Neck: Phonation normal. Neck supple.   Cardiovascular: Normal rate, regular rhythm and normal heart sounds.   No murmur heard.Exam reveals no gallop and no friction rub.   Pulmonary/Chest: Effort normal and breath sounds normal. No stridor. No respiratory distress. She has no decreased breath sounds. She has no wheezes. She has no rhonchi. She has no rales.    Comments: + wet cough    Abdominal: Normal appearance.   Neurological: She is alert and oriented to person, place, and time. Coordination normal.   Skin: Skin is intact, not diaphoretic and not pale.   Psychiatric: Her speech is normal and behavior is normal. Judgment and thought content normal.   Nursing note and vitals reviewed.        Assessment:       1. Acute bacterial sinusitis          Plan:         Acute bacterial sinusitis  -     fluticasone propionate (FLONASE) 50 mcg/actuation nasal spray; 1 spray (50 mcg total) by Each Nostril route 2 (two) times daily as needed.  Dispense: 15 g; Refill: 0  -     promethazine-dextromethorphan (PROMETHAZINE-DM) 6.25-15 mg/5 mL Syrp; Take 5 mLs by mouth every 4 (four) hours as needed (cough).  Dispense: 180 mL; Refill: 0  -     amoxicillin-clavulanate 875-125mg (AUGMENTIN) 875-125 mg per tablet; Take 1 tablet by mouth every 12 (twelve) hours. for 7 days  Dispense: 14 tablet; Refill: 0

## 2022-02-08 NOTE — PATIENT INSTRUCTIONS
You must understand that you have received treatment at an Urgent Care facility only, and that you may be  released before all of your medical problems are known or treated. Urgent Care facilities are not equipped to  handle life threatening emergencies. It is recommended that you seek care at an Emergency Department for  further evaluation of worsening or concerning symptoms, or possibly life threatening conditions as  discussed.  Patient Education       Bacterial Upper Respiratory Infection, Adult   About this topic   Germs cause this health problem. You have signs in your nose, windpipe, voice box or larynx, throat, ears, or lungs that last for days or weeks. It often spreads from a person who is sick to some other person from close contact. This health problem may include:  · Sore throat. This is pharyngitis.  · Swelling of the lining of the nose. This is rhinitis.  · Swelling of the sinuses with pain in the face, forehead, or upper teeth. This is sinusitis.  · Swelling of the nose and throat. This is nasopharyngitis.  · Swelling of the upper part of the voice box. This is epiglottitis.  · Swelling of the voice box. This is laryngitis.  · Cough  What are the causes?   The main cause is an infection by certain germs.  What can make this more likely to happen?   · Cold or winter season  · Low immune system  · Close contact with someone who has an upper respiratory infection  · Allergies or asthma  · Working in a school or day care center  What are the main signs?   · Cough or sneezing  · Sore throat  · Runny or stuffy nose  · Ear pain  · Fever  · Headache  · Muscle pain  · Tired  · Weakness  How does the doctor diagnose this health problem?   Your doctor will do an exam and ask about your history. Your doctor will check your nose, throat, and lungs. The doctor may order:  · Lab tests  · Throat swab  · Chest x-ray  · CT or MRI scan  How does the doctor treat this health problem?   Your doctor may give you drugs to  treat or prevent infection. You may also be given other drugs based on your condition. Talk to your doctor about what drugs you need to take.  What lifestyle changes are needed?   You need to rest while you are getting better. If your throat is sore, don't talk too much. This will rest your voice and throat. Drink plenty of fluids to stay hydrated.  What drugs may be needed?   The doctor may order drugs to:  · Help with pain and swelling  · Fight an infection  · Dry up a stuffy nose  · Stop wheezing  · Control coughing  What can be done to prevent this health problem?   · Wash your hands often with soap and water for at least 20 seconds, especially after coughing or sneezing. Alcohol-based hand sanitizers also work to kill the virus.  · If you are sick, cover your mouth and nose with tissue when you cough or sneeze. You can also cough into your elbow. Throw away tissues in the trash and wash your hands after touching used tissues.  · Clean commonly handled things like door handles, remotes, toys, and phones. Wipe them with a disinfectant.  · Do not get too close (kissing, hugging) to people who are sick.  · Do not share food, drinks, towels, or hankies with anyone who is sick.  · Stay away from crowded places.  Where can I learn more?   American Academy of Family Physicians  https://familydoctor.org/antibiotic-resistance/   American Academy of Family Physicians  https://familydoctor.org/condition/colds-and-the-flu/   Centers for Disease Control and Prevention  http://www.cdc.gov/getsmart/antibiotic-use/URI/index.html   NHS Choices  http://www.nhs.uk/conditions/Respiratory-tract-infection/Pages/Introduction.aspx   Last Reviewed Date   2020-01-24  Consumer Information Use and Disclaimer   This information is not specific medical advice and does not replace information you receive from your health care provider. This is only a brief summary of general information. It does NOT include all information about conditions,  illnesses, injuries, tests, procedures, treatments, therapies, discharge instructions or life-style choices that may apply to you. You must talk with your health care provider for complete information about your health and treatment options. This information should not be used to decide whether or not to accept your health care providers advice, instructions or recommendations. Only your health care provider has the knowledge and training to provide advice that is right for you.  Copyright   Copyright © 2021 [employer] and its affiliates and/or licensors. All rights reserved.

## 2022-10-07 ENCOUNTER — OFFICE VISIT (OUTPATIENT)
Dept: URGENT CARE | Facility: CLINIC | Age: 32
End: 2022-10-07
Payer: COMMERCIAL

## 2022-10-07 VITALS
OXYGEN SATURATION: 98 % | TEMPERATURE: 98 F | HEART RATE: 73 BPM | SYSTOLIC BLOOD PRESSURE: 119 MMHG | WEIGHT: 150 LBS | DIASTOLIC BLOOD PRESSURE: 69 MMHG | BODY MASS INDEX: 23.54 KG/M2 | HEIGHT: 67 IN

## 2022-10-07 DIAGNOSIS — J01.40 ACUTE NON-RECURRENT PANSINUSITIS: ICD-10-CM

## 2022-10-07 DIAGNOSIS — J02.9 ACUTE PHARYNGITIS, UNSPECIFIED ETIOLOGY: Primary | ICD-10-CM

## 2022-10-07 PROCEDURE — 3078F DIAST BP <80 MM HG: CPT | Mod: CPTII,S$GLB,, | Performed by: FAMILY MEDICINE

## 2022-10-07 PROCEDURE — 99214 OFFICE O/P EST MOD 30 MIN: CPT | Mod: S$GLB,,, | Performed by: FAMILY MEDICINE

## 2022-10-07 PROCEDURE — 1160F PR REVIEW ALL MEDS BY PRESCRIBER/CLIN PHARMACIST DOCUMENTED: ICD-10-PCS | Mod: CPTII,S$GLB,, | Performed by: FAMILY MEDICINE

## 2022-10-07 PROCEDURE — 3008F BODY MASS INDEX DOCD: CPT | Mod: CPTII,S$GLB,, | Performed by: FAMILY MEDICINE

## 2022-10-07 PROCEDURE — 1159F PR MEDICATION LIST DOCUMENTED IN MEDICAL RECORD: ICD-10-PCS | Mod: CPTII,S$GLB,, | Performed by: FAMILY MEDICINE

## 2022-10-07 PROCEDURE — 3008F PR BODY MASS INDEX (BMI) DOCUMENTED: ICD-10-PCS | Mod: CPTII,S$GLB,, | Performed by: FAMILY MEDICINE

## 2022-10-07 PROCEDURE — 3074F SYST BP LT 130 MM HG: CPT | Mod: CPTII,S$GLB,, | Performed by: FAMILY MEDICINE

## 2022-10-07 PROCEDURE — 3078F PR MOST RECENT DIASTOLIC BLOOD PRESSURE < 80 MM HG: ICD-10-PCS | Mod: CPTII,S$GLB,, | Performed by: FAMILY MEDICINE

## 2022-10-07 PROCEDURE — 3074F PR MOST RECENT SYSTOLIC BLOOD PRESSURE < 130 MM HG: ICD-10-PCS | Mod: CPTII,S$GLB,, | Performed by: FAMILY MEDICINE

## 2022-10-07 PROCEDURE — 1160F RVW MEDS BY RX/DR IN RCRD: CPT | Mod: CPTII,S$GLB,, | Performed by: FAMILY MEDICINE

## 2022-10-07 PROCEDURE — 1159F MED LIST DOCD IN RCRD: CPT | Mod: CPTII,S$GLB,, | Performed by: FAMILY MEDICINE

## 2022-10-07 PROCEDURE — 99214 PR OFFICE/OUTPT VISIT, EST, LEVL IV, 30-39 MIN: ICD-10-PCS | Mod: S$GLB,,, | Performed by: FAMILY MEDICINE

## 2022-10-07 RX ORDER — DEXTROMETHORPHAN HBR, GUAIFENESIN AND PSEUDOEPHEDRINE HCL 60; 380; 20 MG/1; MG/1; MG/1
1 TABLET ORAL EVERY 6 HOURS PRN
Qty: 20 TABLET | Refills: 0 | Status: SHIPPED | OUTPATIENT
Start: 2022-10-07 | End: 2023-03-31 | Stop reason: ALTCHOICE

## 2022-10-07 RX ORDER — CEFDINIR 300 MG/1
300 CAPSULE ORAL 2 TIMES DAILY
Qty: 20 CAPSULE | Refills: 0 | Status: SHIPPED | OUTPATIENT
Start: 2022-10-07 | End: 2022-10-17

## 2022-10-07 RX ORDER — NAPROXEN 375 MG/1
375 TABLET ORAL 2 TIMES DAILY
Qty: 20 TABLET | Refills: 0 | Status: SHIPPED | OUTPATIENT
Start: 2022-10-07 | End: 2023-03-31 | Stop reason: ALTCHOICE

## 2022-10-07 NOTE — PATIENT INSTRUCTIONS
Please drink plenty of fluids.  Please get plenty of rest.  Please return here or go to the Emergency Department for any concerns or worsening of condition.  If you were given wait & see antibiotics, please wait 3-5 days before taking them, and only take them if your symptoms have worsened or not improved.  If you do begin taking the antibiotics, please take them to completion.  If you were prescribed antibiotics, please take them to completion.  If you were prescribed a narcotic medication, do not drive or operate heavy equipment or machinery while taking these medications.    You were given a decongestant (RESCON or POLY VENT Dm).  If your insurance does not cover it or you cannot afford it, it is ok to use the over the counter products listed below.  If you do not have Hypertension or any history of palpitations, it is ok to take over the counter Sudafed or Mucinex D or Allegra-D or Claritin-D or Zyrtec-D.  If you do take one of the above, it is ok to combine that with plain over the counter Mucinex or Allegra or Claritin or Zyrtec.  If for example you are taking Zyrtec -D, you can combine that with Mucinex, but not Mucinex-D.  If you are taking Mucinex-D, you can combine that with plain Allegra or Claritin or Zyrtec.   If you do have Hypertension or palpitations, it is safe to take Coricidin HBP for relief of sinus symptoms.    We recommend you take over the counter Flonase (Fluticasone) or another nasally inhaled steroid unless you are already taking one.  Nasal irrigation with a saline spray or Netti Pot like device per their directions is also recommended.  If not allergic, please take over the counter Tylenol (Acetaminophen) and/or Motrin (Ibuprofen) as directed for control of pain and/or fever.    Robitussin DM 2 teas every 4 hours as needed for cough.  If you  smoke, please stop smoking.    Please follow up with your primary care doctor or specialist as needed.  Jaciel Ruiz MD  [phone]    You must  understand that you have received treatment at an Urgent Care facility only, and that you may be  released before all of your medical problems are known or treated. Urgent Care facilities are not equipped to  handle life threatening emergencies. It is recommended that you seek care at an Emergency Department for  further evaluation of worsening or concerning symptoms, or possibly life threatening conditions as  discussed.    Pharyngitis: Strep (Presumed)    You have pharyngitis (sore throat). The cause is thought to be the streptococcus, or strep, bacterium. Strep throat infection can cause throat pain that is worse when swallowing, aching all over, headache, and fever. The infection may be spread by coughing, kissing, or touching others after touching your mouth or nose. Antibiotic medications are given to treat the infection.  Home care  Rest at home. Drink plenty of fluids to avoid dehydration.  No work or school for the first 2 days of taking the antibiotics. After this time, you will not be contagious. You can then return to work or school if you are feeling better.   The antibiotic medication must be taken for the full 10 days, even if you feel better. This is very important to ensure the infection is treated. It is also important to prevent drug-resistant organisms from developing. If you were given an antibiotic shot, no more antibiotics are needed.  You may use acetaminophen or ibuprofen to control pain or fever, unless another medicine was prescribed for this. If you have chronic liver or kidney disease or ever had a stomach ulcer or GI bleeding, talk with your doctor before using these medicines.  Throat lozenges or a throat-numbing sprays can help reduce throat pain. Gargling with warm salt water can also help. Dissolve 1/2 teaspoon of salt in 1 8 ounce glass of warm water.   Avoid salty or spicy foods, which can irritate the throat.  Follow-up care  Follow up with your healthcare provider or our staff  if you are not improving over the next week.  When to seek medical advice  Call your healthcare provider right away if any of these occur:  Fever as directed by your doctor.   New or worsening ear pain, sinus pain, or headache  Painful lumps in the back of neck  Stiff neck  Lymph nodes are getting larger  Inability to swallow liquids, excessive drooling, or inability to open mouth wide due to throat pain  Signs of dehydration (very dark urine or no urine, sunken eyes, dizziness)  Trouble breathing or noisy breathing  Muffled voice  New rash  Date Last Reviewed: 4/13/2015 © 2000-2016 [employer]. [address] [place] [address]. All rights reserved. This information is not intended as a substitute for professional medical care. Always follow your healthcare professional's instructions.      Acute Bacterial Rhinosinusitis (ABRS)  Acute bacterial rhinosinusitis (ABRS) is an infection of your nasal cavity and sinuses. Its caused by bacteria. Acute means that youve had symptoms for less than 12 weeks.  Understanding your sinuses  The nasal cavity is the large air-filled space behind your nose. The sinuses are a group of spaces formed by the bones of your face. They connect with your nasal cavity. ABRS causes the tissue lining these spaces to become inflamed. Mucus may not drain normally. This leads to facial pain and other symptoms.  What causes ABRS?  ABRS most often follows an upper respiratory infection caused by a virus. Bacteria then infect the lining of your nasal cavity and sinuses. But you can also get ABRS if you have:  Nasal allergies  Long-term nasal swelling and congestion not caused by allergies  Blockage in the nose  Symptoms of ABRS  The symptoms of ABRS may be different for each person, and can include:  Nasal congestion  Runny nose  Fluid draining from the nose down the throat (postnasal drip)  Headache  Cough  Pain in the sinuses  Thick, colored fluid from the nose  (mucus)  Fever  Diagnosing ABRS  ABRS may be diagnosed if youve had an upper respiratory infection like a cold and cough for longer than 10 to 14 days. Your health care provider will ask about your symptoms and your medical history. The provider will check your vital signs, including your temperature. Youll have a physical exam. The health care provider will check your ears, nose, and throat. You likely wont need any tests. If ABRS comes back, you may have a culture or other tests.  Treatment for ABRS  Treatment may include:  Antibiotic medicine. This is for symptoms that last for at least 10 to 14 days.  Nasal corticosteroid medicine. Drops or spray used in the nose can lessen swelling and congestion.  Over-the-counter pain medicine. This is to lessen sinus pain and pressure.  Nasal decongestant medicine. Spray or drops may help to lessen congestion. Do not use them for more than a few days.  Salt wash (saline irrigation). This can help to loosen mucus.  Possible complications of ABRS  ABRS may come back or become long-term (chronic).  In rare cases, ABRS may cause complications such as:   Inflamed tissue around the brain and spinal cord (meningitis)  Inflamed tissue around the eyes (orbital cellulitis)  Inflamed bones around the sinuses (osteitis)  These problems may need to be treated in a hospital with intravenous (IV) antibiotic medicine or surgery.  When to call the health care provider  Call your health care provider if you have any of the following:  Symptoms that dont get better, or get worse  Symptoms that dont get better after 3 to 5 days on antibiotics  Trouble seeing  Swelling around your eyes  Confusion or trouble staying awake   Date Last Reviewed: 3/3/2015  © 5702-5598 [employer]. [address] [place] [address], [place] [address]. All rights reserved. This information is not intended as a substitute for professional medical care. Always follow your healthcare professional's  instructions.

## 2022-10-07 NOTE — PROGRESS NOTES
"Subjective:       Patient ID: Isaias Anguiano is a 32 y.o. female.    Vitals:  height is 5' 7" (1.702 m) and weight is 68 kg (150 lb). Her oral temperature is 98.1 °F (36.7 °C). Her blood pressure is 119/69 and her pulse is 73. Her oxygen saturation is 98%.     Chief Complaint: URI and Sore Throat    Patient states that the symptoms started last Tuesday. Also mentioned that she has sore throat started yesterday.     URI   This is a new problem. The current episode started in the past 7 days. The problem has been gradually worsening. There has been no fever. Associated symptoms comments: Post nasal drip . Treatments tried: cough syrup. The treatment provided mild relief.     Constitution: Negative.   HENT: Negative.     Cardiovascular: Negative.    Eyes: Negative.    Respiratory: Negative.     Gastrointestinal: Negative.    Endocrine: negative.   Genitourinary: Negative.    Musculoskeletal: Negative.    Skin: Negative.    Allergic/Immunologic: Negative.    Neurological: Negative.    Hematologic/Lymphatic: Negative.    Psychiatric/Behavioral: Negative.       Objective:      Physical Exam   Constitutional: She is oriented to person, place, and time. She appears well-developed. She is cooperative.  Non-toxic appearance. She does not appear ill. No distress.   HENT:   Head: Normocephalic and atraumatic.   Ears:   Right Ear: Hearing, tympanic membrane, external ear and ear canal normal.   Left Ear: Hearing, tympanic membrane, external ear and ear canal normal.   Nose: No mucosal edema, rhinorrhea or nasal deformity. No epistaxis. Right sinus exhibits maxillary sinus tenderness and frontal sinus tenderness. Left sinus exhibits maxillary sinus tenderness and frontal sinus tenderness.   Mouth/Throat: Uvula is midline and mucous membranes are normal. No trismus in the jaw. Normal dentition. No uvula swelling. Posterior oropharyngeal edema and posterior oropharyngeal erythema present. No oropharyngeal exudate.   Eyes: Conjunctivae " and lids are normal. No scleral icterus.   Neck: Trachea normal and phonation normal. Neck supple. No edema present. No erythema present. No neck rigidity present.   Cardiovascular: Normal rate, regular rhythm, normal heart sounds and normal pulses.   Pulmonary/Chest: Effort normal and breath sounds normal. No respiratory distress. She has no decreased breath sounds. She has no rhonchi.   Abdominal: Normal appearance.   Musculoskeletal: Normal range of motion.         General: No deformity. Normal range of motion.   Neurological: She is alert and oriented to person, place, and time. She exhibits normal muscle tone. Coordination normal.   Skin: Skin is warm, dry, intact, not diaphoretic and not pale.   Psychiatric: Her speech is normal and behavior is normal. Judgment and thought content normal.   Nursing note and vitals reviewed.      Assessment:       1. Acute pharyngitis, unspecified etiology    2. Acute non-recurrent pansinusitis          Plan:         Acute pharyngitis, unspecified etiology    Acute non-recurrent pansinusitis  -     cefdinir (OMNICEF) 300 MG capsule; Take 1 capsule (300 mg total) by mouth 2 (two) times daily. for 10 days  Dispense: 20 capsule; Refill: 0  -     pseudoephedrine-DM-guaiFENesin (POLY-VENT DM) 60- mg Tab; Take 1 tablet by mouth every 6 (six) hours as needed.  Dispense: 20 tablet; Refill: 0  -     naproxen (NAPROSYN) 375 MG tablet; Take 1 tablet (375 mg total) by mouth 2 (two) times daily.  Dispense: 20 tablet; Refill: 0        Please drink plenty of fluids.  Please get plenty of rest.  Please return here or go to the Emergency Department for any concerns or worsening of condition.  If you were given wait & see antibiotics, please wait 3-5 days before taking them, and only take them if your symptoms have worsened or not improved.  If you do begin taking the antibiotics, please take them to completion.  If you were prescribed antibiotics, please take them to completion.  If you  were prescribed a narcotic medication, do not drive or operate heavy equipment or machinery while taking these medications.    You were given a decongestant (RESCON or POLY VENT Dm).  If your insurance does not cover it or you cannot afford it, it is ok to use the over the counter products listed below.  If you do not have Hypertension or any history of palpitations, it is ok to take over the counter Sudafed or Mucinex D or Allegra-D or Claritin-D or Zyrtec-D.  If you do take one of the above, it is ok to combine that with plain over the counter Mucinex or Allegra or Claritin or Zyrtec.  If for example you are taking Zyrtec -D, you can combine that with Mucinex, but not Mucinex-D.  If you are taking Mucinex-D, you can combine that with plain Allegra or Claritin or Zyrtec.   If you do have Hypertension or palpitations, it is safe to take Coricidin HBP for relief of sinus symptoms.    We recommend you take over the counter Flonase (Fluticasone) or another nasally inhaled steroid unless you are already taking one.  Nasal irrigation with a saline spray or Netti Pot like device per their directions is also recommended.  If not allergic, please take over the counter Tylenol (Acetaminophen) and/or Motrin (Ibuprofen) as directed for control of pain and/or fever.    Robitussin DM 2 teas every 4 hours as needed for cough.  If you  smoke, please stop smoking.    Please follow up with your primary care doctor or specialist as needed.  Jaciel Ruiz MD  [phone]    You must understand that you have received treatment at an Urgent Care facility only, and that you may be  released before all of your medical problems are known or treated. Urgent Care facilities are not equipped to  handle life threatening emergencies. It is recommended that you seek care at an Emergency Department for  further evaluation of worsening or concerning symptoms, or possibly life threatening conditions as  discussed.

## 2022-10-07 NOTE — LETTER
October 7, 2022  Isaias Anguiano  [address]  [place] [address]                [facility]  [address], [address]  [place] [address]  Phone: [phone]  Fax: [phone] Isaias Anguiano was seen and treated in our Urgent Care department on 10/7/2022. She may return to work in 2 - 3 days.      If you have any questions or concerns, please don't hesitate to call.        Sincerely,        Luis Carlos So MD

## 2023-03-31 ENCOUNTER — OFFICE VISIT (OUTPATIENT)
Dept: URGENT CARE | Facility: CLINIC | Age: 33
End: 2023-03-31
Payer: COMMERCIAL

## 2023-03-31 VITALS
SYSTOLIC BLOOD PRESSURE: 110 MMHG | BODY MASS INDEX: 23.54 KG/M2 | OXYGEN SATURATION: 98 % | DIASTOLIC BLOOD PRESSURE: 68 MMHG | WEIGHT: 150 LBS | TEMPERATURE: 98 F | RESPIRATION RATE: 16 BRPM | HEIGHT: 67 IN | HEART RATE: 80 BPM

## 2023-03-31 DIAGNOSIS — J01.40 ACUTE NON-RECURRENT PANSINUSITIS: Primary | ICD-10-CM

## 2023-03-31 DIAGNOSIS — Z11.59 ENCOUNTER FOR SCREENING FOR VIRAL DISEASE: ICD-10-CM

## 2023-03-31 DIAGNOSIS — J02.9 SORE THROAT: ICD-10-CM

## 2023-03-31 DIAGNOSIS — J02.9 VIRAL PHARYNGITIS: ICD-10-CM

## 2023-03-31 LAB
CTP QC/QA: YES
CTP QC/QA: YES
MOLECULAR STREP A: NEGATIVE
SARS-COV-2 AG RESP QL IA.RAPID: NEGATIVE

## 2023-03-31 PROCEDURE — 99213 OFFICE O/P EST LOW 20 MIN: CPT | Mod: S$GLB,,,

## 2023-03-31 PROCEDURE — 99213 PR OFFICE/OUTPT VISIT, EST, LEVL III, 20-29 MIN: ICD-10-PCS | Mod: S$GLB,,,

## 2023-03-31 PROCEDURE — 87811 SARS CORONAVIRUS 2 ANTIGEN POCT, MANUAL READ: ICD-10-PCS | Mod: QW,S$GLB,,

## 2023-03-31 PROCEDURE — 87811 SARS-COV-2 COVID19 W/OPTIC: CPT | Mod: QW,S$GLB,,

## 2023-03-31 PROCEDURE — 87651 POCT STREP A MOLECULAR: ICD-10-PCS | Mod: QW,S$GLB,,

## 2023-03-31 PROCEDURE — 87651 STREP A DNA AMP PROBE: CPT | Mod: QW,S$GLB,,

## 2023-03-31 RX ORDER — AMOXICILLIN AND CLAVULANATE POTASSIUM 500; 125 MG/1; MG/1
1 TABLET, FILM COATED ORAL 2 TIMES DAILY
Qty: 10 TABLET | Refills: 0 | Status: SHIPPED | OUTPATIENT
Start: 2023-03-31 | End: 2023-04-05

## 2023-03-31 RX ORDER — PHENOL 1.4 %
AEROSOL, SPRAY (ML) MUCOUS MEMBRANE
Qty: 20 ML | Refills: 0 | Status: SHIPPED | OUTPATIENT
Start: 2023-03-31 | End: 2023-04-04

## 2023-03-31 NOTE — PATIENT INSTRUCTIONS
SORE THROAT  ?Take over-the-counter pain medicine - Tylenol or Ibuprofen can help with throat pain.  ?Use sore throat lozenges or sprays - Using medicated sore throat lozenges or throat sprays can temporarily reduce throat pain.  ?Suck on hard candies, ice chips, or ice pops.  ?Gargle with salt water - Some people find that this helps with throat pain.  ?Use a cool mist humidifier - This adds moisture to the air to keep the throat from getting too dry and might help with pain.  ?Avoid smoking or being around people who are smoking - Smoke can make throat pain worse.

## 2023-03-31 NOTE — PROGRESS NOTES
"Subjective:      Patient ID: Isaias Anguiano is a 32 y.o. female.    Vitals:  height is 5' 7" (1.702 m) and weight is 68 kg (150 lb). Her oral temperature is 98.1 °F (36.7 °C). Her blood pressure is 110/68 and her pulse is 80. Her respiration is 16 and oxygen saturation is 98%.     Chief Complaint: Sore Throat    Sore Throat   This is a new problem. Episode onset: 2 DAYS AGO. The problem has been gradually worsening. The pain is at a severity of 8/10. The pain is moderate. Associated symptoms include trouble swallowing. Pertinent negatives include no abdominal pain, congestion, coughing, diarrhea, ear pain, headaches, neck pain, shortness of breath or vomiting. Treatments tried: tylenol. The treatment provided mild relief.     Constitution: Negative for fatigue and fever.   HENT:  Positive for postnasal drip, sinus pressure, sore throat, trouble swallowing and voice change. Negative for ear pain, congestion and sinus pain.    Neck: Positive for painful lymph nodes. Negative for neck pain.   Respiratory:  Negative for cough, shortness of breath and wheezing.    Gastrointestinal:  Negative for abdominal pain, vomiting and diarrhea.   Skin:  Negative for rash.   Neurological:  Negative for headaches.   Hematologic/Lymphatic: Positive for swollen lymph nodes.    Objective:     Physical Exam   Constitutional: She is oriented to person, place, and time. She appears well-developed. She is cooperative.  Non-toxic appearance. She does not appear ill. No distress.   HENT:   Head: Normocephalic and atraumatic.   Ears:   Right Ear: Hearing, tympanic membrane, external ear and ear canal normal. Tympanic membrane is not erythematous and not bulging. Tympanic membrane mobility is normal. No middle ear effusion.   Left Ear: Hearing, tympanic membrane, external ear and ear canal normal. Tympanic membrane is not erythematous and not bulging. Tympanic membrane mobility is normal.  No middle ear effusion.   Nose: No mucosal edema, " rhinorrhea, nasal deformity or congestion. No epistaxis. Right sinus exhibits no maxillary sinus tenderness and no frontal sinus tenderness. Left sinus exhibits no maxillary sinus tenderness and no frontal sinus tenderness.   Mouth/Throat: Uvula is midline and mucous membranes are normal. No trismus in the jaw. Normal dentition. No uvula swelling. Posterior oropharyngeal erythema present. No oropharyngeal exudate, posterior oropharyngeal edema, tonsillar abscesses or cobblestoning. Tonsils are 2+ on the right. Tonsils are 2+ on the left. No tonsillar exudate.   Eyes: Conjunctivae and lids are normal. No scleral icterus.   Neck: Trachea normal and phonation normal. Neck supple. No edema present. No erythema present. No neck rigidity present.   Cardiovascular: Normal rate, regular rhythm, normal heart sounds and normal pulses.   Pulmonary/Chest: Effort normal and breath sounds normal. No stridor. No respiratory distress. She has no decreased breath sounds. She has no wheezes. She has no rhonchi. She has no rales.   Abdominal: Normal appearance. There is no splenomegaly or hepatomegaly. There is no abdominal tenderness.   Musculoskeletal: Normal range of motion.         General: No deformity. Normal range of motion.   Lymphadenopathy:        Head (right side): No submental, no submandibular, no tonsillar, no preauricular, no posterior auricular and no occipital adenopathy present.        Head (left side): No submental, no submandibular, no tonsillar, no preauricular, no posterior auricular and no occipital adenopathy present.     She has cervical adenopathy.   Neurological: She is alert and oriented to person, place, and time. She exhibits normal muscle tone. Coordination normal.   Skin: Skin is warm, dry, intact, not diaphoretic, not pale and no rash.   Psychiatric: Her speech is normal and behavior is normal. Judgment and thought content normal.   Nursing note and vitals reviewed.    Assessment:     1. Acute  non-recurrent pansinusitis    2. Sore throat    3. Encounter for screening for viral disease    4. Viral pharyngitis        Plan:       Acute non-recurrent pansinusitis  -     amoxicillin-clavulanate 500-125mg (AUGMENTIN) 500-125 mg Tab; Take 1 tablet (500 mg total) by mouth 2 (two) times daily. for 5 days  Dispense: 10 tablet; Refill: 0    Sore throat  -     POCT Strep A, Molecular  -     SARS Coronavirus 2 Antigen, POCT Manual Read    Encounter for screening for viral disease    Viral pharyngitis  -     phenoL (CHLORASEPTIC THROAT SPRAY) 1.4 % SprA; by Mucous Membrane route every 2 (two) hours. for 4 days  Dispense: 20 mL; Refill: 0    No red flags of peritonsillar abscess, muffled voice, unilateral pain/tenderness, neck pain/jaw pain; epiglottitis - stridor, drooling, fever, respiratory distress; Retropharyngeal space infection - diffuse swelling, breathing difficulties noted.    Results for orders placed or performed in visit on 03/31/23   POCT Strep A, Molecular   Result Value Ref Range    Molecular Strep A, POC Negative Negative     Acceptable Yes    SARS Coronavirus 2 Antigen, POCT Manual Read   Result Value Ref Range    SARS Coronavirus 2 Antigen Negative Negative     Acceptable Yes          Discussed NEGATIVE test results and plan of care with patient.  They voice full understanding and are in agreement with the current plan of care.  All known questions and concerns addressed at this time.      You must understand that you have received treatment at an Urgent Care Facility only, and that you may be released before all of your medical problems are known or treated.  Urgent Care facilities are not equipped to handle life threatening emergencies.  It is recommended that you seek care at an Emergency Department for further evaluation of worsening or concerning symptoms, or possibly life threatening conditions as discussed.

## 2023-10-05 DIAGNOSIS — N39.3 SUI (STRESS URINARY INCONTINENCE, FEMALE): Primary | ICD-10-CM

## 2023-10-14 ENCOUNTER — OFFICE VISIT (OUTPATIENT)
Dept: URGENT CARE | Facility: CLINIC | Age: 33
End: 2023-10-14
Payer: COMMERCIAL

## 2023-10-14 VITALS
OXYGEN SATURATION: 98 % | HEART RATE: 66 BPM | TEMPERATURE: 98 F | BODY MASS INDEX: 24.33 KG/M2 | HEIGHT: 67 IN | RESPIRATION RATE: 18 BRPM | SYSTOLIC BLOOD PRESSURE: 109 MMHG | WEIGHT: 155 LBS | DIASTOLIC BLOOD PRESSURE: 75 MMHG

## 2023-10-14 DIAGNOSIS — J01.40 ACUTE NON-RECURRENT PANSINUSITIS: Primary | ICD-10-CM

## 2023-10-14 DIAGNOSIS — R05.8 COUGH PRODUCTIVE OF PURULENT SPUTUM: ICD-10-CM

## 2023-10-14 PROCEDURE — 99213 PR OFFICE/OUTPT VISIT, EST, LEVL III, 20-29 MIN: ICD-10-PCS | Mod: S$GLB,,,

## 2023-10-14 PROCEDURE — 99213 OFFICE O/P EST LOW 20 MIN: CPT | Mod: S$GLB,,,

## 2023-10-14 RX ORDER — PROMETHAZINE HYDROCHLORIDE AND DEXTROMETHORPHAN HYDROBROMIDE 6.25; 15 MG/5ML; MG/5ML
5 SYRUP ORAL EVERY 6 HOURS PRN
Qty: 118 ML | Refills: 0 | Status: SHIPPED | OUTPATIENT
Start: 2023-10-14 | End: 2023-10-21

## 2023-10-14 RX ORDER — IPRATROPIUM BROMIDE 21 UG/1
1 SPRAY, METERED NASAL 2 TIMES DAILY
Qty: 30 ML | Refills: 0 | Status: SHIPPED | OUTPATIENT
Start: 2023-10-14 | End: 2023-10-18

## 2023-10-14 RX ORDER — AMOXICILLIN AND CLAVULANATE POTASSIUM 875; 125 MG/1; MG/1
1 TABLET, FILM COATED ORAL EVERY 12 HOURS
Qty: 14 TABLET | Refills: 0 | Status: SHIPPED | OUTPATIENT
Start: 2023-10-14 | End: 2023-10-21

## 2023-10-14 RX ORDER — PREDNISONE 20 MG/1
20 TABLET ORAL DAILY
Qty: 5 TABLET | Refills: 0 | Status: SHIPPED | OUTPATIENT
Start: 2023-10-14 | End: 2023-10-19

## 2023-10-14 NOTE — PROGRESS NOTES
"Subjective:      Patient ID: Isaias Anguiano is a 33 y.o. female.    Vitals:  height is 5' 7" (1.702 m) and weight is 70.3 kg (155 lb). Her oral temperature is 98.3 °F (36.8 °C). Her blood pressure is 109/75 and her pulse is 66. Her respiration is 18 and oxygen saturation is 98%.     Chief Complaint: Cough    Pt is coming in today for cough and congestion that began about a week ago. Pt has tried sudafed and cough medication.     Cough  This is a new problem. The current episode started 1 to 4 weeks ago. The problem has been unchanged. The problem occurs every few minutes. The cough is Productive of sputum. Associated symptoms include nasal congestion and postnasal drip. Pertinent negatives include no ear congestion, ear pain, fever, headaches or sore throat. Nothing aggravates the symptoms. Treatments tried: sudafed, left over cough syrup. The treatment provided no relief. There is no history of asthma, bronchitis or pneumonia.       Constitution: Negative for fever.   HENT:  Positive for congestion, postnasal drip and sinus pressure. Negative for ear pain and sore throat.    Respiratory:  Positive for cough and sputum production (green).    Neurological:  Negative for headaches.      Objective:     Physical Exam   Constitutional: She is oriented to person, place, and time. She appears well-developed. She is cooperative.  Non-toxic appearance. She does not appear ill. No distress.   HENT:   Head: Normocephalic and atraumatic.   Ears:   Right Ear: Hearing, external ear and ear canal normal.   Left Ear: Hearing, external ear and ear canal normal.      Comments: Serous effusions present bilaterally.   Nose: Rhinorrhea and congestion present. No mucosal edema or nasal deformity. No epistaxis. Right sinus exhibits no maxillary sinus tenderness and no frontal sinus tenderness. Left sinus exhibits no maxillary sinus tenderness and no frontal sinus tenderness.   Mouth/Throat: Uvula is midline and mucous membranes are normal. " Mucous membranes are moist. No trismus in the jaw. Normal dentition. No uvula swelling. Posterior oropharyngeal erythema present. No oropharyngeal exudate or posterior oropharyngeal edema.   Eyes: Conjunctivae and lids are normal. No scleral icterus.   Neck: Trachea normal and phonation normal. Neck supple. No edema present. No erythema present. No neck rigidity present.   Cardiovascular: Normal rate and regular rhythm.   Pulmonary/Chest: Effort normal and breath sounds normal. No respiratory distress. She has no decreased breath sounds. She has no wheezes. She has no rhonchi.         Comments: Patient has dry cough present during exam. Pt is able to talk in full, clear sentences.     Abdominal: Normal appearance.   Musculoskeletal: Normal range of motion.         General: No deformity. Normal range of motion.   Neurological: She is alert and oriented to person, place, and time. She exhibits normal muscle tone. Coordination normal.   Skin: Skin is warm, dry, intact, not diaphoretic and not pale.   Psychiatric: Her speech is normal and behavior is normal. Judgment and thought content normal.   Nursing note and vitals reviewed.      Assessment:     1. Acute non-recurrent pansinusitis    2. Cough productive of purulent sputum        Plan:       Acute non-recurrent pansinusitis  -     amoxicillin-clavulanate 875-125mg (AUGMENTIN) 875-125 mg per tablet; Take 1 tablet by mouth every 12 (twelve) hours. for 7 days  Dispense: 14 tablet; Refill: 0  -     ipratropium (ATROVENT) 21 mcg (0.03 %) nasal spray; 1 spray by Each Nostril route 2 (two) times daily. for 4 days  Dispense: 30 mL; Refill: 0    Cough productive of purulent sputum  -     predniSONE (DELTASONE) 20 MG tablet; Take 1 tablet (20 mg total) by mouth once daily. for 5 days  Dispense: 5 tablet; Refill: 0  -     promethazine-dextromethorphan (PROMETHAZINE-DM) 6.25-15 mg/5 mL Syrp; Take 5 mLs by mouth every 6 (six) hours as needed (cough).  Dispense: 118 mL; Refill:  0    Please drink plenty of fluids. Please get plenty of rest.  Please return here or go to the Emergency Department for any concerns or worsening of condition.  If you were prescribed antibiotics, please take them to completion.  If you do not have Hypertension or any history of palpitations, it is ok to take over the counter Sudafed or Mucinex D or Allegra-D or Claritin-D or Zyrtec-D.  If you do take one of the above, it is ok to combine that with plain over the counter Mucinex or Allegra or Claritin or Zyrtec.  If for example you are taking Zyrtec -D, you can combine that with Mucinex, but not Mucinex-D.  If you are taking Mucinex-D, you can combine that with plain Allegra or Claritin or Zyrtec.   If you do have Hypertension or palpitations, it is safe to take Coricidin HBP for relief of sinus symptoms.  If not allergic, please take over the counter Tylenol (Acetaminophen) and/or Motrin (Ibuprofen) as directed for control of pain and/or fever.  Please follow up with your primary care doctor or specialist as needed.    If you  smoke, please stop smoking. Discussed with patient the importance of f/u with their primary care provider. Urged to go to the ER for any worsening signs or symptoms.       Patient Instructions   1.  Take all medications as directed. If you have been prescribed antibiotics, make sure to complete them.   2.  Rest and keep yourself/patient well hydrated. For adults, it is recommended to drink at least 8-10 glasses of water daily.   3.  For patients above 6 months of age who are not allergic to and are not on anticoagulants, you can alternate Tylenol and Motrin every 4-6 hours for fever above 100.4F and/or pain.  For patients less than 6 months of age, allergic to or intolerant to NSAIDS, have gastritis, gastric ulcers, or history of GI bleeds, are pregnant, or are on anticoagulant therapy, you can take Tylenol every 4 hours as needed for fever above 100.4F and/or pain.   4. You should schedule  a follow-up appointment with your Primary Care Provider/Pediatrician for recheck in 2-3 days or as directed at this visit.   5.  If your condition fails to improve in a timely manner, you should receive another evaluation by your Primary Care Provider/Pediatrician to discuss your concerns or return to urgent care for a recheck.  If your condition worsens at any time, you should report immediately to your nearest Emergency Department for further evaluation. **You must understand that you have received Urgent Care treatment only and that you may be released before all of your medical problems are known or treated. You, the patient, are responsible to arrange for follow-up care as instructed.

## 2023-12-08 ENCOUNTER — CLINICAL SUPPORT (OUTPATIENT)
Dept: REHABILITATION | Facility: HOSPITAL | Age: 33
End: 2023-12-08
Attending: SPECIALIST
Payer: COMMERCIAL

## 2023-12-08 DIAGNOSIS — R27.8 COORDINATION ABNORMAL: ICD-10-CM

## 2023-12-08 DIAGNOSIS — R53.1 WEAKNESS: ICD-10-CM

## 2023-12-08 DIAGNOSIS — N39.3 SUI (STRESS URINARY INCONTINENCE, FEMALE): Primary | ICD-10-CM

## 2023-12-08 PROCEDURE — 97161 PT EVAL LOW COMPLEX 20 MIN: CPT | Mod: PN | Performed by: PHYSICAL THERAPIST

## 2023-12-08 NOTE — PLAN OF CARE
[facility]   Physical Therapy Initial Evaluation     Date: 2023   Name: Isaias Anguiano  Clinic Number: [id number]    Therapy Diagnosis:   Encounter Diagnoses   Name Primary?    TRICE (stress urinary incontinence, female) Yes    Coordination abnormal     Weakness      Physician: Steph Viveros MD    Physician Orders: PT Eval and Treat PF PT  Medical Diagnosis from Referral: N39.3 (ICD-10-CM) - TRICE (stress urinary incontinence, female)  Evaluation Date: 2023  Authorization Period Expiration: 10/4/2024  Plan of Care Expiration: 3/1/2024  Progress Note Due: 2024  Visit #  Visits authorized:    FOTO: 1/3    Precautions: Standard     Time In: 11:53 AM   Time Out: 12:31 PM   Total Appointment Time (timed & untimed codes): 38 minutes    HISTORY      Isaias is a 33 y.o. female evaluated on 2023    Physician:  Steph Viveros MD   Diagnosis:   Encounter Diagnoses   Name Primary?    TRICE (stress urinary incontinence, female) Yes    Coordination abnormal     Weakness       Treatment ordered: Physical Therapy  Medical History: No past medical history on file.   Surgical History:   Past Surgical History:   Procedure Laterality Date     SECTION x 2 - Kids are 8, 6, and 4      TUBAL LIGATION      WRIST SURGERY      Tummy ann - 1.5 years ago to correct DR and remove extra skin        Medications: no medication    Allergies:   Review of patient's allergies indicates:  No Known Allergies     Precautions: universal    OB/GYN History:  childbirth vaginal delivery x1 (was in labor for 44 hours) and Caesarean x2 (1st placental abruption, 2nd planned )      Bladder/Bowel History: trouble initiating urine stream, trouble feeling bladder urge/fullness, trouble emptying bladder completely, recurrent bladder infections, urinary incontinence, and constipation/straining for movement      SUBJECTIVE     Date of onset: 2 years ago - before the tummy tuck  - with   "Tyra    History of current complaint: Usually sees Dr. Huffman. States that she was having "odd" issues after her 3rd child - would take her 10-15 min to urinate. Felt like she had UTI symptoms. Took months to find the correct antibiotic which corrected the symptoms of UTI, but still was unable to urinate. Had a cystoscope with hydro-distention that was unremarkable, and she was able to urinate better. She had urodynamics done and she did not feel the urge to urinate. States that she has always been able to go 8-10 hours without urinating. She now tries to go urinate more often during the day. She does double void now every time. She is not using a catheter. She feel fine with this now, but she will leak when she is exercising. With jumping jacks, squat jumps, and jumping on the trampoline. States that she will feel it on impact. She does not have a problem with leakage with running and other normal activities. Wearing pads when she knows she is working out. States that she had trouble urinating after her tummy tuck as well.     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 Does not like taking medication. Did do a small amount of Miralax while pregnant. States that she has chronic pain with the Erb's Palsy - does not take medication - Tylenol or Naproxen when needed.     Patient's goals for therapy: improve leakage of urine     Pain: Patient reports 0/10, with 0 being the lowest and 10 being the highest.    Activities that cause symptoms: vigorous activity or exercise    Previous treatment included was given Flowmax to help with urinating - no longer on this    Sexually active? Yes - denies     Frequency of urination:   Daytime: 3-4 times a day           Nighttime: denies    Difficulty initiating urine stream: No  Urine stream: strong  Complete emptying: Yes  Bladder leakage: Yes  Frequency of incidents: with exercise   Amount leaked (urine): teaspoons - decent amount - always urinates before she works out "     Frequency of bowel movements: once every 3-4 days, has been unable to correct; hx of hemorrhoids and anal fissures, always feels bloated   Difficulty initiating BM: No  Quality/Shape of BM: [place] Stool Chart 4  Colon leakage: No  Frequency of incidents: none   Amount leaked (bowels): none  Form of protection: wear pads when working out, otherwise, no protection   Number of pads required in 24 hours: 1    Types of fluid intake: water (4-6 cups), juice, and tea - need 4.5 bottles or 77oz   Diet: regular diet   Current exercise: exercise at home - weights, [employer] videos - variety - 30-45 min     Occupation: 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    OBJECTIVE     ORTHO SCREEN  Posture: flexed posturing, right scapular winging   Pelvic alignment: not assessed    ABDOMINALS  Scarring: post surgery  Diastasis: not assessed  Abdominal strength: Rectus: not assessed     TA: 5/5    VAGINAL PELVIC FLOOR EXAM - to be performed next visit    TREATMENT      Education: instructed on general anatomy/physiology of urinary/bowel system; discussed plan of care with patient and parent/guardian; instructed in benefits/risks of treatment; instructed in alternative methods of treatment; instructed in risks of refusing treatment; patient a parent agreed to treatment plan.     Also educated in: anatomy/physiology of pelvic floor, posture/body mechanices, fluid intake/dietary modifications, and behavior modifications    ASSESSMENT      This is a 33 y.o. female referred to outpatient physical therapy and presents with a medical diagnosis of N39.3 (ICD-10-CM) - TRICE (stress urinary incontinence, female). Patient will benefit from skilled physical therapy to improve core and PF awareness and coordination, improve bladder control, and improve bowel habits.    Educational/Spiritual/Cultural needs: none  Abuse/Neglect: no signs  Nutritional Status: WDWN   Fall Risk: patient is not a fall  risk    Pt's spiritual, cultural and educational needs considered and pt agreeable to plan of care and goals as stated below:     Medical Necessity is demonstrated by the following:  History  Co-morbidities and personal factors that may impact the plan of care [x] LOW: no personal factors / co-morbidities  [] MODERATE: 1-2 personal factors / co-morbidities  [] HIGH: 3+ personal factors / co-morbidities    Moderate / High Support Documentation:   Co-morbidities affecting plan of care: none    Personal Factors:   no deficits     Examination  Body Structures and Functions, activity limitations and participation restrictions that may impact the plan of care [x] LOW: addressing 1-2 elements  [] MODERATE: 3+ elements  [] HIGH: 4+ elements (please support below)    Moderate / High Support Documentation:      Clinical Presentation [x] LOW: stable  [] MODERATE: Evolving  [] HIGH: Unstable     Decision Making/ Complexity Score: low           PLAN    Frequency: 1x per week  Duration: 12 weeks    Short Term Goals: 6 weeks   Patient will be independent with pelvic floor down training.  Patient will be independent with pelvic floor relaxation to improve bowel and bladder evacuation without straining.  Patient will be able to demonstrate improved pelvic floor relaxation and contraction on rehabilitative ultrasound vs. sEMG.  Patient will report regular meditation, diaphragmatic breathing, pelvic floor down training.   Patient will report being dry 5/7 days a week with exercise.  Patient will be independent with good water intake.     Long Term Goals: 12 weeks   Patient will report urinating every 3-4 hours with strong urine stream.   Patient will report improved quality of life and tolerance for activities outside of her home due to improved bladder habits.  Patient will demonstrate adequate pelvic floor coordination with contraction and relaxation on sEMG vs rehabilitative ultrasound.    Patient will be independent with proper  core and pelvic floor coordination with progressive strength training.      Physical therapy will include: therapeutic exercise, manual therapy, therapeutic activities, neuromuscular re-education, manual therapy, modalities PRN, gait training, and self-care education.       Therapist: Vamsi Siu PT  Board-certified Women's Health Clinical Specialist  12/8/2023

## 2024-01-09 ENCOUNTER — CLINICAL SUPPORT (OUTPATIENT)
Dept: REHABILITATION | Facility: HOSPITAL | Age: 34
End: 2024-01-09
Attending: FAMILY MEDICINE
Payer: MEDICARE

## 2024-01-09 DIAGNOSIS — R53.1 WEAKNESS: ICD-10-CM

## 2024-01-09 DIAGNOSIS — R27.8 COORDINATION ABNORMAL: ICD-10-CM

## 2024-01-09 DIAGNOSIS — N39.3 SUI (STRESS URINARY INCONTINENCE, FEMALE): Primary | ICD-10-CM

## 2024-01-09 PROCEDURE — 97140 MANUAL THERAPY 1/> REGIONS: CPT | Performed by: PHYSICAL THERAPIST

## 2024-01-09 PROCEDURE — 97530 THERAPEUTIC ACTIVITIES: CPT | Performed by: PHYSICAL THERAPIST

## 2024-01-09 NOTE — PROGRESS NOTES
Pelvic Health Physical Therapy   Treatment Note     Name: Isaias Anguiano  [facility] Number: [id number]    Therapy Diagnosis:   Encounter Diagnoses   Name Primary?    TRICE (stress urinary incontinence, female) Yes    Coordination abnormal     Weakness      Physician: No ref. provider found    Visit Date: 1/9/2024    Physician Orders: PT Eval and Treat PF PT  Medical Diagnosis from Referral: N39.3 (ICD-10-CM) - TRICE (stress urinary incontinence, female)  Evaluation Date: 12/8/2023  Authorization Period Expiration: 10/4/2024  Plan of Care Expiration: 3/1/2024  Progress Note Due: 1/5/2024  Visit # 2/12 Visits authorized: 1/ 1   FOTO: 1/3      Cancelled Visits: 0  No Show Visits: 0    Time In: 8:19 AM   Time Out: 09:05 AM  Total Billable Time: 46 minutes    Precautions: Standard    Subjective     Pt reports: No new changes since her last visit.    She was compliant with home exercise program.  Response to previous treatment: none  Functional change: none    Pain in:  0/10  Pain out: 0/10  Location: none       Objective       Isaias received the following manual therapy techniques:  for 15 minutes including:   VAGINAL PELVIC FLOOR EXAM    EXTERNAL ASSESSMENT  Introitus: WNL, laying in a posterior pelvic tilt  Skin condition: WNL  Scarring: none   Sensation: WNL   Pain:  none  Voluntary contraction: nil  Voluntary relaxation: nil  Involuntary contraction: nil  Bearing down: nil  Perineal descent: absent      INTERNAL ASSESSMENT  Pain: none   Sensation: WNL  Vaginal vault: stenotic   Muscle Bulk: hypertonus   Muscle Power: 2/5  Muscle Endurance: DNT  # Reps To Fatigue: DNT    Fast Contractions: DNT     Quality of contraction: incomplete relaxation   Specificity: WNL   Coordination: limited PF mobility and awareness   Prolapse check: none  Comments: impaired pelvic floor relaxation, supine she is in more of a posterior pelvic tilt     Isaias participated in dynamic functional therapeutic activities to improve functional performance  for 31  minutes, including:  Educated on diaphragmatic breathing, pelvic ROM, and proper core activation.           Intervention Eval  01/09/2024    Neuro Re-Ed         Manual Ther Vaginal exam   Hypertonus    TherAct Education    Educated on diaphragmatic breathing, pelvic ROM, and proper core activation.          Home Exercises Provided and Patient Education Provided     Education provided:   - anatomy/physiology of pelvic floor, posture/body mechanices, and isometric abdominal exercises  Discussed progression of plan of care with patient; educated pt in activity modification; reviewed HEP with pt. Pt demonstrated and verbalized understanding of all instruction and was provided with a handout of HEP (see Patient Instructions).    Written Home Exercises Provided: yes.  Exercises were reviewed and Isaias was able to demonstrate them prior to the end of the session.  Isaias demonstrated good  understanding of the education provided.     See EMR under Patient Instructions for exercises provided 01/09/2024 .    Assessment     Patient with increased lower abdominal pressure with attempts at core activation. Needs work on body awareness and specifically core and PF awareness.     Isaias Is progressing well towards her goals.   Pt prognosis is Excellent.     Pt will continue to benefit from skilled outpatient physical therapy to address the deficits listed in the problem list box on initial evaluation, provide pt/family education and to maximize pt's level of independence in the home and community environment.     Pt's spiritual, cultural and educational needs considered and pt agreeable to plan of care and goals.     Anticipated barriers to physical therapy: none    Short Term Goals: 6 weeks   Patient will be independent with pelvic floor down training.  Patient will be independent with pelvic floor relaxation to improve bowel and bladder evacuation without straining.  Patient will be able to demonstrate improved pelvic  floor relaxation and contraction on rehabilitative ultrasound vs. sEMG.  Patient will report regular meditation, diaphragmatic breathing, pelvic floor down training.   Patient will report being dry 5/7 days a week with exercise.  Patient will be independent with good water intake.      Long Term Goals: 12 weeks   Patient will report urinating every 3-4 hours with strong urine stream.   Patient will report improved quality of life and tolerance for activities outside of her home due to improved bladder habits.  Patient will demonstrate adequate pelvic floor coordination with contraction and relaxation on sEMG vs rehabilitative ultrasound.    Patient will be independent with proper core and pelvic floor coordination with progressive strength training.      Plan     Progressive core and PF awareness. Further PF down training.     Vamsi Siu, PT

## 2024-01-09 NOTE — PATIENT INSTRUCTIONS
DIAPHRAGMATIC BREATHING     The diaphragm is a dome shaped muscle that forms the floor of the rib cage. It is the most efficient muscle for breathing and relaxation, although most people are not used to using the diaphragm. Diaphragmatic or belly breathing is an important technique to learn because it helps settle down or relax the autonomic nervous system. The correct use of diaphragmatic breathing can help to quiet brain activity resulting in the relaxation of all the muscles and organs of the body. This is accomplished by slow rhythmic breathing concentrated in the diaphragm muscle rather than the chest.    How to do proper relaxation breathing:    Start by lying on your back or reclining in a chair in a relaxed position. Place one hand on your chest and the other on your abdomen.  Relax your jaw by placing your tongue on the floor of your mouth and keeping your teeth slightly apart.   Take a deep breath in, letting the abdomen expand and rise while you keep your upper chest, neck and shoulders relaxed.   As you breathe out, allow your abdomen and chest to fall. Exhale completely.  It doesn't matter if you breathe in/out through your nose and/or mouth. Do whichever feels comfortable.  Remember to breathe slowly.  Do not force your breathing. Do not hold your breath.  Repeat for 5 minutes every day.         You tube for 10 min videos           Pelvic floor relaxation: gentle feeling of down and out - easier when you are in a pelvic neutral position   - occurs when urination, bowel movement, vaginal penetration, or passing gas  - every hour - tune into your pelvic floor and relax   - do not hold tension in the hips  - with breathing - let the diaphragm descend and the pelvic floor descend     Abdominal strength: be aware of abdominal pressure - I do not want you feeling a push out from down low - gentle contraction - do not squeeze harder to achieve   - inhale - open the lower ribs  - exhale - draw the lower ribs  inward  - do not hold your breath - no one should know what you are doing   - 3x10, 10 sec hold

## 2024-01-22 ENCOUNTER — CLINICAL SUPPORT (OUTPATIENT)
Dept: REHABILITATION | Facility: HOSPITAL | Age: 34
End: 2024-01-22
Attending: SPECIALIST
Payer: COMMERCIAL

## 2024-01-22 DIAGNOSIS — R53.1 WEAKNESS: ICD-10-CM

## 2024-01-22 DIAGNOSIS — R27.8 COORDINATION ABNORMAL: ICD-10-CM

## 2024-01-22 DIAGNOSIS — N39.3 SUI (STRESS URINARY INCONTINENCE, FEMALE): Primary | ICD-10-CM

## 2024-01-22 PROCEDURE — 97530 THERAPEUTIC ACTIVITIES: CPT | Mod: PN | Performed by: PHYSICAL THERAPIST

## 2024-01-22 PROCEDURE — 97112 NEUROMUSCULAR REEDUCATION: CPT | Mod: PN | Performed by: PHYSICAL THERAPIST

## 2024-01-22 NOTE — PATIENT INSTRUCTIONS
With core:   - start training with small jumps progressing to larger jumps as you can maintaining core and dryness   - gentle belly button to your back - too hard will push the urine out   - start by feeling what is happening with core while laying down     Do not suck in all the time!   Still need muscular support from the core.    Abdominal strength:   - inhale - open the lower ribs  - exhale - draw the lower ribs inward, draw pelvic bones inward   - do not hold your breath - no one should know what you are doing   - 3x10, 10 sec hold

## 2024-01-22 NOTE — PROGRESS NOTES
"  Pelvic Health Physical Therapy   Treatment Note and Progress Note     Name: Isaias Anguiano  Clinic Number: [id number]    Therapy Diagnosis:   Encounter Diagnoses   Name Primary?    TRICE (stress urinary incontinence, female) Yes    Coordination abnormal     Weakness      Physician: Steph Viveros MD    Visit Date: 1/22/2024    Physician Orders: PT Eval and Treat PF PT  Medical Diagnosis from Referral: N39.3 (ICD-10-CM) - TRICE (stress urinary incontinence, female)  Evaluation Date: 12/8/2023  Authorization Period Expiration: 10/4/2024  Plan of Care Expiration: 3/1/2024  Progress Note Due: 2/19/2024  Visit # 3/12 Visits authorized: 2/20   FOTO: 1/3      Cancelled Visits: 0  No Show Visits: 0    Time In: 9:44 AM    Time Out: 10:24 AM    Total Billable Time: 40 minutes    Precautions: Standard    Subjective     Pt reports: "It is the same" States that she is having trouble connecting with the core and PF.     She was compliant with home exercise program.  Response to previous treatment: none  Functional change: none    Pain in:  0/10  Pain out: 0/10  Location: none       Objective       Isaias participated in neuromuscular re-education activities to develop Coordination, Down training, and Proprioception for 30 minutes including: pelvic floor downtraining with assist of RUSI  and adbominal bracing. Rehabilitative ultrasound did not work for the entire session.  Provided pt feedback with her hands over her core and chest with breathing and abdominal contraction.     Isaias participated in dynamic functional therapeutic activities to improve functional performance for 10  minutes, including:  Educated on exercise progression to assist with stress urinary incontinence.          Intervention Eval  01/09/2024 01/22/2024    Neuro Re-Ed      pelvic floor downtraining with assist of RUSI  and adbominal bracing. Rehabilitative ultrasound did not work for the entire session.  Provided pt feedback with her hands over her core and " chest with breathing and abdominal contraction.    Manual Ther Vaginal exam   Hypertonus    TherAct Education    Educated on diaphragmatic breathing, pelvic ROM, and proper core activation.   Educated on exercise progression to assist with stress urinary incontinence.          Home Exercises Provided and Patient Education Provided     Education provided:   - anatomy/physiology of pelvic floor, posture/body mechanices, and isometric abdominal exercises  Discussed progression of plan of care with patient; educated pt in activity modification; reviewed HEP with pt. Pt demonstrated and verbalized understanding of all instruction and was provided with a handout of HEP (see Patient Instructions).    Written Home Exercises Provided: yes.  Exercises were reviewed and Isaias was able to demonstrate them prior to the end of the session.  Isaias demonstrated good  understanding of the education provided.     See EMR under Patient Instructions for exercises provided 01/22/2024 .    Assessment     Patient with increased lower abdominal pressure with attempts at core activation. Needs work on body awareness and specifically core and PF awareness.     Isaias Is progressing well towards her goals.   Pt prognosis is Excellent.     Pt will continue to benefit from skilled outpatient physical therapy to address the deficits listed in the problem list box on initial evaluation, provide pt/family education and to maximize pt's level of independence in the home and community environment.     Pt's spiritual, cultural and educational needs considered and pt agreeable to plan of care and goals.     Anticipated barriers to physical therapy: none    Short Term Goals: 6 weeks   Patient will be independent with pelvic floor down training. Goal not met - progressing   Patient will be independent with pelvic floor relaxation to improve bowel and bladder evacuation without straining. Goal not met - progressing  Patient will be able to demonstrate  improved pelvic floor relaxation and contraction on rehabilitative ultrasound vs. sEMG.Goal not met - progressing  Patient will report regular meditation, diaphragmatic breathing, pelvic floor down training. Goal not met - progressing  Patient will report being dry 5/7 days a week with exercise.Goal not met - progressing  Patient will be independent with good water intake. Goal not met - progressing     Long Term Goals: 12 weeks   Patient will report urinating every 3-4 hours with strong urine stream. Goal not met - progressing  Patient will report improved quality of life and tolerance for activities outside of her home due to improved bladder habits.Goal not met - progressing  Patient will demonstrate adequate pelvic floor coordination with contraction and relaxation on sEMG vs rehabilitative ultrasound.  Goal not met - progressing  Patient will be independent with proper core and pelvic floor coordination with progressive strength training.  Goal not met - progressing    Plan     Progressive core and PF awareness. Further PF down training and core activation.     Vamsi Siu, PT

## 2024-02-19 ENCOUNTER — CLINICAL SUPPORT (OUTPATIENT)
Dept: REHABILITATION | Facility: HOSPITAL | Age: 34
End: 2024-02-19
Attending: SPECIALIST
Payer: COMMERCIAL

## 2024-02-19 DIAGNOSIS — N39.3 SUI (STRESS URINARY INCONTINENCE, FEMALE): Primary | ICD-10-CM

## 2024-02-19 DIAGNOSIS — R27.8 COORDINATION ABNORMAL: ICD-10-CM

## 2024-02-19 DIAGNOSIS — R53.1 WEAKNESS: ICD-10-CM

## 2024-02-19 PROCEDURE — 97112 NEUROMUSCULAR REEDUCATION: CPT | Mod: PN | Performed by: PHYSICAL THERAPIST

## 2024-02-19 PROCEDURE — 97530 THERAPEUTIC ACTIVITIES: CPT | Mod: PN | Performed by: PHYSICAL THERAPIST

## 2024-02-19 NOTE — PROGRESS NOTES
Pelvic Health Physical Therapy   Treatment Note and Progress Note     Name: Isaias Anguiano  Clinic Number: [id number]    Therapy Diagnosis:   Encounter Diagnoses   Name Primary?    TRICE (stress urinary incontinence, female) Yes    Coordination abnormal     Weakness      Physician: Steph Viveros MD    Visit Date: 2/19/2024    Physician Orders: PT Eval and Treat PF PT  Medical Diagnosis from Referral: N39.3 (ICD-10-CM) - TRICE (stress urinary incontinence, female)  Evaluation Date: 12/8/2023  Authorization Period Expiration: 10/4/2024  Plan of Care Expiration: 3/1/2024  Progress Note Due: 3/18/2024  Visit # 4/12 Visits authorized: 3/20   FOTO: 2/3      Cancelled Visits: 5  No Show Visits: 0    Time In: 9:35 AM   Time Out:10:35 AM  Total Billable Time: 60 minutes    Precautions: Standard    Subjective     Pt reports: Feels like she is back to square one. Has been really busy lately. Has not been able to exercise much due to being busy. Still with leakage.       She was compliant with home exercise program.  Response to previous treatment: none  Functional change: none    Pain in:  0/10  Pain out: 0/10  Location: none       Objective       Isaias participated in neuromuscular re-education activities to develop Coordination, Down training, and Proprioception for 45 minutes including: pelvic floor downtraining with assist of RUSI  and adbominal bracing. Lower abdominal and core with emphasis on management of intra-abdominal pressure.     Isaias participated in dynamic functional therapeutic activities to improve functional performance for 15  minutes, including:  Educated on exercise progression and modifications to assist with stress urinary incontinence.          Intervention Eval  01/09/2024 01/22/2024 02/19/2024    Neuro Re-Ed      pelvic floor downtraining with assist of RUSI  and adbominal bracing. Rehabilitative ultrasound did not work for the entire session.  Provided pt feedback with her hands over her core and  chest with breathing and abdominal contraction.  pelvic floor downtraining with assist of RUSI  and adbominal bracing. Lower abdominal and core with emphasis on management of intra-abdominal pressure.    Manual Ther Vaginal exam   Hypertonus     TherAct Education    Educated on diaphragmatic breathing, pelvic ROM, and proper core activation.   Educated on exercise progression to assist with stress urinary incontinence.    Educated on exercise progression and modifications to assist with stress urinary incontinence.          Home Exercises Provided and Patient Education Provided     Education provided:   - anatomy/physiology of pelvic floor, posture/body mechanices, and isometric abdominal exercises  Discussed progression of plan of care with patient; educated pt in activity modification; reviewed HEP with pt. Pt demonstrated and verbalized understanding of all instruction and was provided with a handout of HEP (see Patient Instructions).    Written Home Exercises Provided: yes.  Exercises were reviewed and Isaias was able to demonstrate them prior to the end of the session.  Isaias demonstrated good  understanding of the education provided.     See EMR under Patient Instructions for exercises provided 02/19/2024 .    Assessment   Good core and PF awareness with treatment today.     Isaias Is progressing well towards her goals.   Pt prognosis is Excellent.     Pt will continue to benefit from skilled outpatient physical therapy to address the deficits listed in the problem list box on initial evaluation, provide pt/family education and to maximize pt's level of independence in the home and community environment.     Pt's spiritual, cultural and educational needs considered and pt agreeable to plan of care and goals.     Anticipated barriers to physical therapy: none    Short Term Goals: 6 weeks   Patient will be independent with pelvic floor down training. Goal not met - progressing   Patient will be independent with  pelvic floor relaxation to improve bowel and bladder evacuation without straining. Goal not met - progressing  Patient will be able to demonstrate improved pelvic floor relaxation and contraction on rehabilitative ultrasound vs. sEMG.Goal not met - progressing  Patient will report regular meditation, diaphragmatic breathing, pelvic floor down training. Goal not met - progressing  Patient will report being dry 5/7 days a week with exercise.Goal not met - progressing  Patient will be independent with good water intake. Goal not met - progressing     Long Term Goals: 12 weeks   Patient will report urinating every 3-4 hours with strong urine stream. Goal not met - progressing  Patient will report improved quality of life and tolerance for activities outside of her home due to improved bladder habits.Goal not met - progressing  Patient will demonstrate adequate pelvic floor coordination with contraction and relaxation on sEMG vs rehabilitative ultrasound.  Goal not met - progressing  Patient will be independent with proper core and pelvic floor coordination with progressive strength training.  Goal not met - progressing    Plan     Progressive core and PF awareness. Further PF down training and core activation.     Vamsi Siu, PT

## 2024-02-19 NOTE — PATIENT INSTRUCTIONS
Daily:   - belly breathing - at least 5 min a day - before bed - let go of the day   - at least every hour - tune into your pelvic floor and relax    - if you are slouching - the pelvic floor will be difficult to relax   - every time you pee or move your bowels - relax/breathe   - mindfulness/body awareness - decrease the muscle spasm when you feel it     3-5 times a week   - core strengthening/awareness/progression   - goal is to develop dynamic core stability - we want the core to work in all movements   - gentle awareness, not hard like someone is going to punch you in the belly   - muscle memory so your body gets used of engaging the core with daily activities    - belly to button to the back  - knee fall out  - heel slides  - quadriped    - dynamic balance - gentle core engagement with the exercises you are doing already     IF YOU FEEL PRESSURE, you PUSH the PEE OUT!   - laws of physics - the farther away a weight is from center  the heavier it is!   - DO NOT PUSH THROUGH!   - DO NOT program the body to be sloppy!   - can still do the time - decrease weight   - your body will use larger muscle groups when needed = expending more energy     - Gentle belly button to your back with ALL exercises - if you feel ballooning - lower the weight, lower reps, modify position, or stop the exercise - should also be dry   - when you contract your core - back down a smidge.

## 2024-02-26 ENCOUNTER — CLINICAL SUPPORT (OUTPATIENT)
Dept: REHABILITATION | Facility: HOSPITAL | Age: 34
End: 2024-02-26
Payer: COMMERCIAL

## 2024-02-26 DIAGNOSIS — N39.3 SUI (STRESS URINARY INCONTINENCE, FEMALE): Primary | ICD-10-CM

## 2024-02-26 DIAGNOSIS — R27.8 COORDINATION ABNORMAL: ICD-10-CM

## 2024-02-26 DIAGNOSIS — R53.1 WEAKNESS: ICD-10-CM

## 2024-02-26 PROCEDURE — 97112 NEUROMUSCULAR REEDUCATION: CPT | Mod: PN | Performed by: PHYSICAL THERAPIST

## 2024-02-26 NOTE — PROGRESS NOTES
Pelvic Health Physical Therapy   Treatment Note      Name: Isaias Anguiano  [facility] Number: [id number]    Therapy Diagnosis:   Encounter Diagnoses   Name Primary?    TRICE (stress urinary incontinence, female) Yes    Coordination abnormal     Weakness      Physician: Steph Viveros MD    Visit Date: 2/26/2024    Physician Orders: PT Eval and Treat PF PT  Medical Diagnosis from Referral: N39.3 (ICD-10-CM) - TRICE (stress urinary incontinence, female)  Evaluation Date: 12/8/2023  Authorization Period Expiration: 10/4/2024  Plan of Care Expiration: 3/1/2024  Progress Note Due: 3/18/2024  Visit # 5/12 Visits authorized: 4/20   FOTO: 2/3      Cancelled Visits: 5  No Show Visits: 0    Time In: 9:51 AM    Time Out: 10:29 AM    Total Billable Time: 38  minutes    Precautions: Standard    Subjective     Pt reports: She states that her bladder is better with the core changes she has made. She was able to do 3x10 jumping jacks without leakage. Had a small leakage with fatigue. She states that she does not trust herself.     She was compliant with home exercise program.  Response to previous treatment: none  Functional change: none    Pain in:  0/10  Pain out: 0/10  Location: none       Objective       Isaias participated in neuromuscular re-education activities to develop Coordination, Down training, and Proprioception for 38 minutes including: pelvic floor downtraining with assist of RUSI  and adbominal bracing. Lower abdominal and core with emphasis on management of intra-abdominal pressure in supine and standing.            Intervention Eval  01/09/2024 01/22/2024 02/19/2024 02/26/2024    Neuro Re-Ed      pelvic floor downtraining with assist of RUSI  and adbominal bracing. Rehabilitative ultrasound did not work for the entire session.  Provided pt feedback with her hands over her core and chest with breathing and abdominal contraction.  pelvic floor downtraining with assist of RUSI  and adbominal bracing. Lower abdominal  and core with emphasis on management of intra-abdominal pressure.   pelvic floor downtraining with assist of RUSI  and adbominal bracing. Lower abdominal and core with emphasis on management of intra-abdominal pressure in supine and standing.    Manual Ther Vaginal exam   Hypertonus      TherAct Education    Educated on diaphragmatic breathing, pelvic ROM, and proper core activation.   Educated on exercise progression to assist with stress urinary incontinence.    Educated on exercise progression and modifications to assist with stress urinary incontinence.           Home Exercises Provided and Patient Education Provided     Education provided:   - anatomy/physiology of pelvic floor, posture/body mechanices, and isometric abdominal exercises  Discussed progression of plan of care with patient; educated pt in activity modification; reviewed HEP with pt. Pt demonstrated and verbalized understanding of all instruction and was provided with a handout of HEP (see Patient Instructions).    Written Home Exercises Provided: yes.  Exercises were reviewed and Isaias was able to demonstrate them prior to the end of the session.  Isaias demonstrated good  understanding of the education provided.     See EMR under Patient Instructions for exercises provided 02/26/2024 .    Assessment   Good core and PF awareness with treatment today.     Isaias Is progressing well towards her goals.   Pt prognosis is Excellent.     Pt will continue to benefit from skilled outpatient physical therapy to address the deficits listed in the problem list box on initial evaluation, provide pt/family education and to maximize pt's level of independence in the home and community environment.     Pt's spiritual, cultural and educational needs considered and pt agreeable to plan of care and goals.     Anticipated barriers to physical therapy: none    Short Term Goals: 6 weeks   Patient will be independent with pelvic floor down training. Goal not met -  progressing   Patient will be independent with pelvic floor relaxation to improve bowel and bladder evacuation without straining. Goal not met - progressing  Patient will be able to demonstrate improved pelvic floor relaxation and contraction on rehabilitative ultrasound vs. sEMG.Goal not met - progressing  Patient will report regular meditation, diaphragmatic breathing, pelvic floor down training. Goal not met - progressing  Patient will report being dry 5/7 days a week with exercise.Goal not met - progressing  Patient will be independent with good water intake. Goal not met - progressing     Long Term Goals: 12 weeks   Patient will report urinating every 3-4 hours with strong urine stream. Goal not met - progressing  Patient will report improved quality of life and tolerance for activities outside of her home due to improved bladder habits.Goal not met - progressing  Patient will demonstrate adequate pelvic floor coordination with contraction and relaxation on sEMG vs rehabilitative ultrasound.  Goal not met - progressing  Patient will be independent with proper core and pelvic floor coordination with progressive strength training.  Goal not met - progressing    Plan     Progressive core and PF awareness. Further PF down training and core activation. Discharge? Decrease frequency to EOW.     Vamsi Siu, PT

## 2024-02-26 NOTE — PATIENT INSTRUCTIONS
With sneezing:   - generate pressure inwards   - practice quick activation of the core and pelvic floor vs. More endurance that we have done with exercise  - practice clearing throat and generate pressure inward

## 2024-03-11 ENCOUNTER — CLINICAL SUPPORT (OUTPATIENT)
Dept: REHABILITATION | Facility: HOSPITAL | Age: 34
End: 2024-03-11
Payer: COMMERCIAL

## 2024-03-11 DIAGNOSIS — R53.1 WEAKNESS: ICD-10-CM

## 2024-03-11 DIAGNOSIS — R27.8 COORDINATION ABNORMAL: ICD-10-CM

## 2024-03-11 DIAGNOSIS — N39.3 SUI (STRESS URINARY INCONTINENCE, FEMALE): Primary | ICD-10-CM

## 2024-03-11 PROCEDURE — 97530 THERAPEUTIC ACTIVITIES: CPT | Mod: PN | Performed by: PHYSICAL THERAPIST

## 2024-03-11 PROCEDURE — 97112 NEUROMUSCULAR REEDUCATION: CPT | Mod: PN | Performed by: PHYSICAL THERAPIST

## 2024-03-11 NOTE — PLAN OF CARE
Pelvic Health Physical Therapy   Treatment Note and Update plan of care and Discharge Summary     Name: Isaias Anguaino  Clinic Number: [id number]    Therapy Diagnosis:   Encounter Diagnoses   Name Primary?    TRICE (stress urinary incontinence, female) Yes    Coordination abnormal     Weakness      Physician: Steph Viveros MD    Visit Date: 3/11/2024    Physician Orders: PT Eval and Treat PF PT  Medical Diagnosis from Referral: N39.3 (ICD-10-CM) - TRICE (stress urinary incontinence, female)  Evaluation Date: 12/8/2023  Authorization Period Expiration: 10/4/2024  Plan of Care Expiration: 3/18/2024  Progress Note Due: 3/18/2024  Visit # 6/12 Visits authorized: 5/20   FOTO: 4/3      Cancelled Visits: 5  No Show Visits: 0    Time In: 9:34 AM    Time Out: 10:15 AM    Total Billable Time: 41  minutes    Precautions: Standard    Subjective     Pt reports: She is doing very well. Feels comfortable with core exercise progression without urinary leakage.     She was compliant with home exercise program.  Response to previous treatment: none  Functional change: none    Pain in:  0/10  Pain out: 0/10  Location: none   Objective       Isaias participated in neuromuscular re-education activities to develop Coordination, Down training, and Proprioception for 31 minutes including:   pelvic floor downtraining with assist of RUSI  and adbominal bracing. Lower abdominal and core activation with emphasis on management of intra-abdominal pressure in supine.    Isaias participated in dynamic functional therapeutic activities to improve functional performance for 10  minutes, including:  Encouraged exercise progression with bladder control as she tolerates.       Intervention Eval  01/09/2024 01/22/2024 02/19/2024 02/26/2024 03/11/2024    Neuro Re-Ed      pelvic floor downtraining with assist of RUSI  and adbominal bracing. Rehabilitative ultrasound did not work for the entire session.  Provided pt feedback with her hands over her core  and chest with breathing and abdominal contraction.  pelvic floor downtraining with assist of RUSI  and adbominal bracing. Lower abdominal and core with emphasis on management of intra-abdominal pressure.   pelvic floor downtraining with assist of RUSI  and adbominal bracing. Lower abdominal and core with emphasis on management of intra-abdominal pressure in supine and standing.    pelvic floor downtraining with assist of RUSI  and adbominal bracing. Lower abdominal and core activation with emphasis on management of intra-abdominal pressure in supine.   Manual Ther Vaginal exam   Hypertonus       TherAct Education    Educated on diaphragmatic breathing, pelvic ROM, and proper core activation.   Educated on exercise progression to assist with stress urinary incontinence.    Educated on exercise progression and modifications to assist with stress urinary incontinence.     Encouraged exercise progression with bladder control as she tolerates.          Home Exercises Provided and Patient Education Provided     Education provided:   - anatomy/physiology of pelvic floor, posture/body mechanices, and isometric abdominal exercises  Discussed progression of plan of care with patient; educated pt in activity modification; reviewed HEP with pt. Pt demonstrated and verbalized understanding of all instruction and was provided with a handout of HEP (see Patient Instructions).    Written Home Exercises Provided: yes.  Exercises were reviewed and Isaias was able to demonstrate them prior to the end of the session.  Isaias demonstrated good  understanding of the education provided.     See EMR under Patient Instructions for exercises provided 03/11/2024 .    Assessment   Patient is agreeable to discharge from physical therapy today. She demonstrates good awareness with home exercise program progression. I am confident she will be compliant.     Isaias Is progressing well towards her goals.   Pt prognosis is Excellent.     Pt will  continue to benefit from skilled outpatient physical therapy to address the deficits listed in the problem list box on initial evaluation, provide pt/family education and to maximize pt's level of independence in the home and community environment.     Pt's spiritual, cultural and educational needs considered and pt agreeable to plan of care and goals.     Anticipated barriers to physical therapy: none    Short Term Goals: 6 weeks   Patient will be independent with pelvic floor down training. Goal met - 03/11/2024   Patient will be independent with pelvic floor relaxation to improve bowel and bladder evacuation without straining. Goal met - 03/11/2024   Patient will be able to demonstrate improved pelvic floor relaxation and contraction on rehabilitative ultrasound vs. sEMG.Goal met - 03/11/2024   Patient will report regular meditation, diaphragmatic breathing, pelvic floor down training. Goal met - 03/11/2024   Patient will report being dry 5/7 days a week with exercise.Goal met - 03/11/2024   Patient will be independent with good water intake. Goal met - 03/11/2024      Long Term Goals: 12 weeks   Patient will report urinating every 3-4 hours with strong urine stream. Goal met - 03/11/2024   Patient will report improved quality of life and tolerance for activities outside of her home due to improved bladder habits.Goal met - 03/11/2024   Patient will demonstrate adequate pelvic floor coordination with contraction and relaxation on sEMG vs rehabilitative ultrasound.  Goal met - 03/11/2024   Patient will be independent with proper core and pelvic floor coordination with progressive strength training.  Goal met - 03/11/2024     Plan   Discharge physical therapy at this time.     Vamsi Siu, PT

## 2024-03-11 NOTE — PROGRESS NOTES
Pelvic Health Physical Therapy   Treatment Note and Update POC     Name: Isaias Anguiano  [facility] Number: [id number]    Therapy Diagnosis:   Encounter Diagnoses   Name Primary?    TRICE (stress urinary incontinence, female) Yes    Coordination abnormal     Weakness      Physician: Steph Viveros MD    Visit Date: 3/11/2024    Physician Orders: PT Eval and Treat PF PT  Medical Diagnosis from Referral: N39.3 (ICD-10-CM) - TRICE (stress urinary incontinence, female)  Evaluation Date: 12/8/2023  Authorization Period Expiration: 10/4/2024  Plan of Care Expiration: 3/18/2024  Progress Note Due: 3/18/2024  Visit # 6/12 Visits authorized: 5/20   FOTO: 4/3      Cancelled Visits: 5  No Show Visits: 0    Time In: 9:34 AM    Time Out: 10:15 AM    Total Billable Time: 41  minutes    Precautions: Standard    Subjective     Pt reports: She is doing very well. Feels comfortable with core exercise progression without urinary leakage.     She was compliant with home exercise program.  Response to previous treatment: none  Functional change: none    Pain in:  0/10  Pain out: 0/10  Location: none   Objective       Isaias participated in neuromuscular re-education activities to develop Coordination, Down training, and Proprioception for 31 minutes including:   pelvic floor downtraining with assist of RUSI  and adbominal bracing. Lower abdominal and core activation with emphasis on management of intra-abdominal pressure in supine.    Isaias participated in dynamic functional therapeutic activities to improve functional performance for 10  minutes, including:  Encouraged exercise progression with bladder control as she tolerates.       Intervention Eval  01/09/2024 01/22/2024 02/19/2024 02/26/2024 03/11/2024    Neuro Re-Ed      pelvic floor downtraining with assist of RUSI  and adbominal bracing. Rehabilitative ultrasound did not work for the entire session.  Provided pt feedback with her hands over her core and chest with breathing and  abdominal contraction.  pelvic floor downtraining with assist of RUSI  and adbominal bracing. Lower abdominal and core with emphasis on management of intra-abdominal pressure.   pelvic floor downtraining with assist of RUSI  and adbominal bracing. Lower abdominal and core with emphasis on management of intra-abdominal pressure in supine and standing.    pelvic floor downtraining with assist of RUSI  and adbominal bracing. Lower abdominal and core activation with emphasis on management of intra-abdominal pressure in supine.   Manual Ther Vaginal exam   Hypertonus       TherAct Education    Educated on diaphragmatic breathing, pelvic ROM, and proper core activation.   Educated on exercise progression to assist with stress urinary incontinence.    Educated on exercise progression and modifications to assist with stress urinary incontinence.     Encouraged exercise progression with bladder control as she tolerates.          Home Exercises Provided and Patient Education Provided     Education provided:   - anatomy/physiology of pelvic floor, posture/body mechanices, and isometric abdominal exercises  Discussed progression of plan of care with patient; educated pt in activity modification; reviewed HEP with pt. Pt demonstrated and verbalized understanding of all instruction and was provided with a handout of HEP (see Patient Instructions).    Written Home Exercises Provided: yes.  Exercises were reviewed and Isaias was able to demonstrate them prior to the end of the session.  Isaias demonstrated good  understanding of the education provided.     See EMR under Patient Instructions for exercises provided 03/11/2024 .    Assessment   Patient is agreeable to discharge from physical therapy today. She demonstrates good awareness with home exercise program progression. I am confident she will be compliant.     Isaias Is progressing well towards her goals.   Pt prognosis is Excellent.     Pt will continue to benefit from skilled  outpatient physical therapy to address the deficits listed in the problem list box on initial evaluation, provide pt/family education and to maximize pt's level of independence in the home and community environment.     Pt's spiritual, cultural and educational needs considered and pt agreeable to plan of care and goals.     Anticipated barriers to physical therapy: none    Short Term Goals: 6 weeks   Patient will be independent with pelvic floor down training. Goal met - 03/11/2024   Patient will be independent with pelvic floor relaxation to improve bowel and bladder evacuation without straining. Goal met - 03/11/2024   Patient will be able to demonstrate improved pelvic floor relaxation and contraction on rehabilitative ultrasound vs. sEMG.Goal met - 03/11/2024   Patient will report regular meditation, diaphragmatic breathing, pelvic floor down training. Goal met - 03/11/2024   Patient will report being dry 5/7 days a week with exercise.Goal met - 03/11/2024   Patient will be independent with good water intake. Goal met - 03/11/2024      Long Term Goals: 12 weeks   Patient will report urinating every 3-4 hours with strong urine stream. Goal met - 03/11/2024   Patient will report improved quality of life and tolerance for activities outside of her home due to improved bladder habits.Goal met - 03/11/2024   Patient will demonstrate adequate pelvic floor coordination with contraction and relaxation on sEMG vs rehabilitative ultrasound.  Goal met - 03/11/2024   Patient will be independent with proper core and pelvic floor coordination with progressive strength training.  Goal met - 03/11/2024     Plan   Discharge physical therapy at this time.     Vamsi Siu, PT

## 2024-08-29 ENCOUNTER — OFFICE VISIT (OUTPATIENT)
Dept: UROLOGY | Facility: CLINIC | Age: 34
End: 2024-08-29
Payer: COMMERCIAL

## 2024-08-29 ENCOUNTER — PATIENT MESSAGE (OUTPATIENT)
Dept: UROLOGY | Facility: CLINIC | Age: 34
End: 2024-08-29

## 2024-08-29 VITALS
WEIGHT: 157.75 LBS | DIASTOLIC BLOOD PRESSURE: 73 MMHG | BODY MASS INDEX: 24.71 KG/M2 | SYSTOLIC BLOOD PRESSURE: 115 MMHG | HEART RATE: 70 BPM

## 2024-08-29 DIAGNOSIS — R10.2 VAGINAL PAIN: ICD-10-CM

## 2024-08-29 DIAGNOSIS — R39.198 DECREASED URINE STREAM: Primary | ICD-10-CM

## 2024-08-29 DIAGNOSIS — R30.0 DYSURIA: ICD-10-CM

## 2024-08-29 LAB
BILIRUB SERPL-MCNC: NEGATIVE MG/DL
BLOOD URINE, POC: NEGATIVE
CLARITY, POC UA: CLEAR
COLOR, POC UA: ABNORMAL
GLUCOSE UR QL STRIP: NEGATIVE
KETONES UR QL STRIP: NEGATIVE
LEUKOCYTE ESTERASE URINE, POC: NEGATIVE
NITRITE, POC UA: NEGATIVE
PH, POC UA: 6.5
POC RESIDUAL URINE VOLUME: 20 ML (ref 0–100)
PROTEIN, POC: ABNORMAL
SPECIFIC GRAVITY, POC UA: 1.01
UROBILINOGEN, POC UA: 0.2

## 2024-08-29 PROCEDURE — 87086 URINE CULTURE/COLONY COUNT: CPT

## 2024-08-29 PROCEDURE — 99999 PR PBB SHADOW E&M-EST. PATIENT-LVL III: CPT | Mod: PBBFAC,,,

## 2024-08-29 RX ORDER — METHENAMINE, SODIUM PHOSPHATE, MONOBASIC, MONOHYDRATE, PHENYL SALICYLATE, METHYLENE BLUE, AND HYOSCYAMINE SULFATE 118; 40.8; 36; 10; .12 MG/1; MG/1; MG/1; MG/1; MG/1
1 CAPSULE ORAL EVERY 6 HOURS PRN
Qty: 20 CAPSULE | Refills: 0 | Status: SHIPPED | OUTPATIENT
Start: 2024-08-29

## 2024-08-29 RX ORDER — TAMSULOSIN HYDROCHLORIDE 0.4 MG/1
0.4 CAPSULE ORAL DAILY
Qty: 30 CAPSULE | Refills: 11 | Status: SHIPPED | OUTPATIENT
Start: 2024-08-29 | End: 2025-08-29

## 2024-08-29 RX ORDER — METHENAMINE, SODIUM PHOSPHATE, MONOBASIC, MONOHYDRATE, PHENYL SALICYLATE, METHYLENE BLUE, AND HYOSCYAMINE SULFATE 118; 40.8; 36; 10; .12 MG/1; MG/1; MG/1; MG/1; MG/1
1 CAPSULE ORAL EVERY 6 HOURS PRN
Qty: 20 CAPSULE | Refills: 0 | Status: CANCELLED | OUTPATIENT
Start: 2024-08-29

## 2024-08-29 NOTE — PROGRESS NOTES
Subjective:       Patient ID: Isaias Anguiano is a 34 y.o. female.    Chief Complaint: dysuria and urethral pain      This is a 34 y.o.  female patient that is new to me.  The patient was self referred  for UTI.  Patient is here today with c/o of ongoing UTI symptoms despite 2 courses of antibiotics. Two weeks ago patient started having urethral pain while urinating as well as off and on throughout the day. Reports burning with urination, urinary frequency, decrease stream, slow void. She was seen by urgent care and given 5 day course of Macrobid, symptoms were not relieved so she went to PCP and was given a 3 day course of ciprofloxacin, culture still pending per patient.   Today patient reports dysuria, urethral pain worse when urinating, slow void, feeling of fullness, and urinary frequency.  Reports that she has a history of recurrent UTIs from 9120-6908, occurring after her third . She saw Dr. Shah in . Reports that she tried to go back to her but she is no longer at this practice and the urology clinic refused to see her even with a referral per patient. Visits from Dr. Shah are uploaded in the media including UDS findings. Reports that after UDS her symptoms got much better and she has not had a UTI since . Reports that she went to PFPT earlier this year and finished a 6 week session for urinary leakage. Reports that she tried taking some left over flomax to help with symptoms but it has not helped, she is unsure if this medication was .   Denies history of kidney stones, gross hematuria.  Daily hydration consists of water, about 42oz per day. Denies nocturia. Reports normal menstrual cycle and bowel movements.  UDS showed decreased sensation and high capacity bladder, possible bladder neck dysfunction  PVR 20ml immediately after urinating in clinic  Urine dipstick- negative for Leukocytes, RBC, and Nitrites.       ---  PMH/PSH/Medications/Allergies/Social history reviewed and as in  chart.    Review of Systems   Constitutional:  Negative for chills and fever.   Respiratory:  Negative for shortness of breath.    Cardiovascular:  Negative for chest pain and palpitations.   Gastrointestinal:  Negative for abdominal pain, constipation and diarrhea.   Genitourinary:  Positive for difficulty urinating, dysuria, frequency and vaginal pain. Negative for flank pain, hematuria, pelvic pain and urgency.   Neurological:  Negative for dizziness and weakness.   Psychiatric/Behavioral:  Negative for agitation, confusion and sleep disturbance.        Objective:      Physical Exam  HENT:      Head: Normocephalic.   Pulmonary:      Effort: Pulmonary effort is normal.   Abdominal:      General: Abdomen is flat.      Palpations: Abdomen is soft.   Genitourinary:     General: Normal vulva.      Urethra: No prolapse, urethral pain, urethral swelling or urethral lesion.   Musculoskeletal:         General: Normal range of motion.      Cervical back: Normal range of motion.   Skin:     General: Skin is warm and dry.   Neurological:      Mental Status: She is alert and oriented to person, place, and time.         Assessment:     Problem Noted   Decreased Urine Stream 8/29/2024   Dysuria 8/29/2024   Vaginal Pain 8/29/2024       Plan:     Advised patient to void every 3 hours, do not hold in the urge to void and drink at least 64oz of water per day.  Rx sent for flomax and uribel, side effects reviewed and instructed patient to drink plenty of water with uribel  Urine culture ordered, will message with results  Referral placed to see Dr. Rouse based on urologic history and further testing possibly being warranted  Follow-up pending referral with Dr. Lofton.    YFN Manuel    I spent a total of 45 minutes on the day of the visit.This includes face to face time and non-face to face time preparing to see the patient (eg, review of tests), obtaining and/or reviewing separately obtained history, documenting clinical  information in the electronic or other health record, independently interpreting results and communicating results to the patient/family/caregiver, or care coordinator.

## 2024-08-29 NOTE — Clinical Note
Dr. Rouse, I placed a referral for this patient to see you. She has history of recurrent UTIs back in 2021, saw Dr. Shah and she did a work-up with UDS which showed decreased sensation, high capacity bladder, possible primary bladder neck dysfunction. Patient told me after UDS her symptoms have gotten better. Visits from Dr. Shah should be uploaded in the media later on today. She tried to go back to Dr. Shah but she is no longer at that clinic and per the patient the clinic is refusing to see her even with a referral. I'm messaging you just to give you an update as well as her progress note from me today. It seems she may need a further work-up or possible tertiary treatment at this point. Thanks, Sherry Chu NP

## 2024-08-30 ENCOUNTER — TELEPHONE (OUTPATIENT)
Dept: UROLOGY | Facility: CLINIC | Age: 34
End: 2024-08-30
Payer: COMMERCIAL

## 2024-08-30 LAB — BACTERIA UR CULT: NO GROWTH

## 2024-08-30 NOTE — TELEPHONE ENCOUNTER
----- Message from Estefania Olsen sent at 8/30/2024  9:39 AM CDT -----  Regarding: New RX Needed  Contact: [phone]  Is this a Refill or New Rx (Script/Out of Refills):  New RX    Name of Caller: Patient     Rx Name: methen-m.blueKeilysBatoolphos-phsal-hyo (URIBEL) [id number] mg Cap    Pharmacy Name:   [id number] - [place], [place] - [address] AT [facility] [address] [facility]  [address] [address]  Phone: [phone] Fax: [phone]    Additional Information: Patient is calling stating that she spoke w pharmacy and the capsules aren't made anymore. She needs a new script sent over for the tablets. Please give pt or pharmacy a call if needed to further discuss.

## 2024-08-30 NOTE — TELEPHONE ENCOUNTER
ANNUAL EXAM:    Chief Complaint   Patient presents with   • Gyn Exam       Domenica Arias declines a chaperone.    SUBJECTIVE:    Patient ID: Domenica Arias is a 79 year old female.    Doing well.  No bleeding, pain, d/c.  Had a respiratory induction early in February but with afebrile so does not believe it was covid.  Had a repeat endoscopy and esophageal ulceration still present.  Also had knee cellulitis which has since resolved.    Patient's medications, allergies, past medical, surgical, social and family histories were reviewed and updated as appropriate.\"  Patient Active Problem List   Diagnosis   • Essential (primary) hypertension   • History of skin cancer   • Osteoporosis   • Acquired hypothyroidism   • Angle-closure glaucoma   • Esophageal ulcer   • GERD (gastroesophageal reflux disease)   • Hypercholesteremia   • Mitral valve disease        OB History    Para Term  AB Living   2 2 2 0 0 2   SAB TAB Ectopic Molar Multiple Live Births   0 0 0 0 0 2         Review of Systems   Constitutional: Negative for appetite change, chills, fatigue and fever.   Respiratory: Negative for cough, chest tightness, shortness of breath and wheezing.    Cardiovascular: Negative for chest pain, palpitations and leg swelling.   Gastrointestinal: Negative for abdominal distention, abdominal pain, anal bleeding, blood in stool, constipation, diarrhea, nausea and vomiting.   Endocrine: Negative for cold intolerance, heat intolerance and polydipsia.   Genitourinary: Negative for dysuria, flank pain, frequency, genital sores, hematuria and urgency.   Musculoskeletal: Negative for arthralgias, back pain, joint swelling and myalgias.   Skin: Negative for color change, rash and wound.   Neurological: Negative for dizziness, tremors, seizures, light-headedness, numbness and headaches.   Hematological: Does not bruise/bleed easily.   Psychiatric/Behavioral: Negative for agitation, confusion, dysphoric mood,  Spoke with patient and informed her that I spoke with pharmacy and the script would have to be resent due to the medication changes and that Sherry's out of the office today and it want get sent until Tuesday. Patient voice her understanding.    hallucinations, sleep disturbance and suicidal ideas.     OBJECTIVE:    Visit Vitals  BP (!) 140/70   Temp 98 °F (36.7 °C) (Temporal)   Ht 5' 3\" (1.6 m)   Wt 53.6 kg (118 lb 3.2 oz)   BMI 20.94 kg/m²       Physical Exam   Constitutional: She is oriented to person, place, and time. She appears well-developed and well-nourished.   Breast exam normal, no masses.   External genitalia normal, no lesions  Vagina normal   Cervix without lesions or abnormal d/c  Uterus normal, no masses or tenderness  Adnexa without masses  Neck: Normal range of motion. No thyromegaly present.   Pulmonary/Chest: Effort normal.   Card- normal distal pulses  Abdominal: Soft. She exhibits no distension, masses, tenderness.  Musculoskeletal: Normal range of motion.   Neurological: She is alert and oriented to person, place, and time.   Skin: Skin is warm and dry. No rash noted.   Psychiatric: She has a normal mood and affect.       1. Routine gynecological examination  Osteoporosis-not treating because of esophageal ulcers, she will continue to exercise supplement with calcium.  History of dense breasts we will do u/s with mammogram.  - MAMMO SCREENING BILATERAL; Future  - US BREAST SCREENING 4 QUADRANTS BILATERAL; Future          ASSESSMENT AND PLAN:   -Patient encouraged to maintain healthy lifestyle, diet and exercise   -Advised SBE.  -Mammogram ordered  -Patient encouraged to take calcium and vitamin D along with performing weight bearing exercises  -Patient to follow up with PCP for annual exam and screenings.   -All questions answered   -Follow up one year or prn.    Nina Kaur MD

## 2024-09-03 ENCOUNTER — PATIENT MESSAGE (OUTPATIENT)
Dept: UROLOGY | Facility: CLINIC | Age: 34
End: 2024-09-03
Payer: COMMERCIAL

## 2024-09-03 RX ORDER — METHENAMINE, BENZOIC ACID, PHENYL SALICYLATE, METHYLENE BLUE, AND HYOSCYAMINE SULFATE 9; .12; 81.6; 10.8; 36.2 MG/1; MG/1; MG/1; MG/1; MG/1
81.6 TABLET, COATED ORAL EVERY 6 HOURS
Qty: 20 EACH | Refills: 0 | Status: SHIPPED | OUTPATIENT
Start: 2024-09-03 | End: 2024-09-06

## 2024-09-06 ENCOUNTER — OFFICE VISIT (OUTPATIENT)
Dept: UROLOGY | Facility: CLINIC | Age: 34
End: 2024-09-06
Payer: COMMERCIAL

## 2024-09-06 VITALS
DIASTOLIC BLOOD PRESSURE: 73 MMHG | WEIGHT: 155 LBS | BODY MASS INDEX: 24.33 KG/M2 | HEART RATE: 66 BPM | SYSTOLIC BLOOD PRESSURE: 108 MMHG | HEIGHT: 67 IN

## 2024-09-06 DIAGNOSIS — R30.0 DYSURIA: ICD-10-CM

## 2024-09-06 DIAGNOSIS — R10.2 VAGINAL PAIN: ICD-10-CM

## 2024-09-06 DIAGNOSIS — R39.198 DECREASED URINE STREAM: ICD-10-CM

## 2024-09-06 PROCEDURE — 87798 DETECT AGENT NOS DNA AMP: CPT | Mod: 59 | Performed by: UROLOGY

## 2024-09-06 PROCEDURE — 99999 PR PBB SHADOW E&M-EST. PATIENT-LVL III: CPT | Mod: PBBFAC,,, | Performed by: UROLOGY

## 2024-09-06 RX ORDER — DOXYCYCLINE HYCLATE 100 MG
100 TABLET ORAL ONCE
Status: CANCELLED | OUTPATIENT
Start: 2024-09-06 | End: 2024-09-06

## 2024-09-06 RX ORDER — DOXYCYCLINE HYCLATE 100 MG
100 TABLET ORAL ONCE
OUTPATIENT
Start: 2024-09-06 | End: 2024-09-06

## 2024-09-06 RX ORDER — LIDOCAINE HYDROCHLORIDE 20 MG/ML
JELLY TOPICAL ONCE
OUTPATIENT
Start: 2024-09-06 | End: 2024-09-06

## 2024-09-06 RX ORDER — LIDOCAINE HYDROCHLORIDE 20 MG/ML
JELLY TOPICAL ONCE
Status: CANCELLED | OUTPATIENT
Start: 2024-09-06 | End: 2024-09-06

## 2024-09-06 NOTE — PROGRESS NOTES
CHIEF COMPLAINT:    Mrs. Anguiano is a 34 y.o. female presenting for a consultation at the request of Sherry Chu NP. Patient presents with dysuria, urgency, frequency after recent UTI.    PRESENTING ILLNESS:    Isaias Anguiano is a 34 y.o. female who presents with a history of recurrent UTI, with symptoms of urethral pain, dysuria, frequency, decreased stream with prolonged voiding.  She was seen by Dr. Shah in [place] however, Dr. Shah left the practice and they refused to see her because she did not have 3 positive cultures.      History distilled from Sherry's note:   History of dysuria, slow urinary stream.  Was a patient of Dr. Shah who did UDS 2021.  She was found to have delayed first sensation at 560 ml, capacity was 870 ml though she was filled to 707 ml, no urodynamic  stress incontinence, voided 521 ml and had a PVR of 349 ml.  The information is from a report not the direct tracings.      Per the patient:  she was better right after the urodynamics and cystoscopy were performed. She only started having the urethral pain, dysuria, frequency and decreased stream after the last bladder infection.     Previous therapies:  she has been doing physical therapy with [facility] for stress incontinence that she has with exercise.  She was able to stop after 6 sessions because she improved that quickly.  She is not on OCP's or a Mirena as she had a tubal ligation.     REVIEW OF SYSTEMS:    Review of Systems   Constitutional: Negative.    HENT: Negative.     Eyes: Negative.    Respiratory: Negative.     Cardiovascular: Negative.    Gastrointestinal: Negative.    Genitourinary:  Positive for dysuria, frequency and urgency.   Musculoskeletal: Negative.    Skin: Negative.    Neurological: Negative.    Endo/Heme/Allergies: Negative.    Psychiatric/Behavioral: Negative.         PATIENT HISTORY:    No past medical history on file.    Past Surgical History:   Procedure Laterality Date     SECTION       TUBAL LIGATION      WRIST SURGERY         Family History   Problem Relation Name Age of Onset    Diabetes Mother      No Known Problems Father         Social History     Socioeconomic History    Marital status:    Tobacco Use    Smoking status: Never    Smokeless tobacco: Never   Substance and Sexual Activity    Alcohol use: Not Currently     Social Determinants of Health     Financial Resource Strain: Low Risk  (8/30/2024)    Overall Financial Resource Strain (CARDIA)     Difficulty of Paying Living Expenses: Not hard at all   Food Insecurity: No Food Insecurity (8/30/2024)    Hunger Vital Sign     Worried About Running Out of Food in the Last Year: Never true     Ran Out of Food in the Last Year: Never true   Physical Activity: Insufficiently Active (8/30/2024)    Exercise Vital Sign     Days of Exercise per Week: 3 days     Minutes of Exercise per Session: 30 min   Stress: No Stress Concern Present (8/30/2024)    [place] - Occupational Stress Questionnaire     Feeling of Stress : Only a little   Housing Stability: Unknown (8/30/2024)    Housing Stability Vital Sign     Unable to Pay for Housing in the Last Year: No       Allergies:  Patient has no known allergies.    Medications:  Outpatient Encounter Medications as of 9/6/2024   Medication Sig Dispense Refill    methen-hyos-me blue-ba-salicyl (URIBEL TABS) 81.6-0.12-10.8 mg Tab Take 81.6 mg by mouth every 6 (six) hours. Drink with full glass of water for 5 days (Patient not taking: Reported on 9/6/2024) 20 each 0    prenatal vit,fran 74/iron/folic (PRENATAL VITAMIN 1+1 ORAL) Take by mouth.      tamsulosin (FLOMAX) 0.4 mg Cap Take 1 capsule (0.4 mg total) by mouth once daily. (Patient not taking: Reported on 9/6/2024) 30 capsule 11     No facility-administered encounter medications on file as of 9/6/2024.         PHYSICAL EXAMINATION:    The patient generally appears in good health, is appropriately interactive, and is in  "no apparent distress.    Skin: No lesions.    Mental: Cooperative with normal affect.    Neuro: Grossly intact.    HEENT: Normal. No evidence of lymphadenopathy.    Chest:  normal inspiratory effort.    Abdomen: Soft, non-tender. No masses or organomegaly. Bladder is not palpable. No evidence of flank discomfort. No evidence of inguinal hernia.    Extremities: No clubbing, cyanosis, or edema    NOTE:  the exam was carried out with a nurse chaperone present  Normal external female genitalia  No erythema medial to Duncan's line  Urethral meatus is normal  Urethra and bladder are nontender to bimanual exam  Well supported anteriorly and posteriorly   Uterus and cervix are normal  No adnexal masses  PVR by bladder scan was 0 ml     LABS:    No results found for: "BUN", "CREATININE"    UA 1.030, pH 5.5, urobilinogen 0.2, otherwise, negative.     IMPRESSION:    Dysuria, urgency, frequency    PLAN:    1.  Urine for ureaplasma PCR  2.  Cystoscopy for dysuria  3.  We did discuss SNM however, because her symptoms are not consistent and there was a 3 year gap between episodes, will evaluate with the cystoscopy and see how she does.     I spent 40 minutes with the patient of which more than half was spent in direct consultation with the patient in regards to our treatment and plan.      Copy to:  Sherry Chu NP    "

## 2024-09-11 ENCOUNTER — PATIENT MESSAGE (OUTPATIENT)
Dept: UROLOGY | Facility: CLINIC | Age: 34
End: 2024-09-11
Payer: COMMERCIAL

## 2024-09-27 ENCOUNTER — TELEPHONE (OUTPATIENT)
Facility: CLINIC | Age: 34
End: 2024-09-27
Payer: COMMERCIAL

## 2024-09-27 NOTE — TELEPHONE ENCOUNTER
Spoke with pt re: procedure appt for Monday.   Discussed location & arrival time for 1:45  Pt verbalized understanding

## 2024-09-30 ENCOUNTER — PROCEDURE VISIT (OUTPATIENT)
Facility: CLINIC | Age: 34
End: 2024-09-30
Payer: COMMERCIAL

## 2024-09-30 VITALS
WEIGHT: 155 LBS | HEART RATE: 73 BPM | HEIGHT: 67 IN | SYSTOLIC BLOOD PRESSURE: 111 MMHG | TEMPERATURE: 98 F | DIASTOLIC BLOOD PRESSURE: 75 MMHG | BODY MASS INDEX: 24.33 KG/M2 | RESPIRATION RATE: 18 BRPM

## 2024-09-30 DIAGNOSIS — R30.0 DYSURIA: ICD-10-CM

## 2024-09-30 PROCEDURE — 52000 CYSTOURETHROSCOPY: CPT | Mod: S$GLB,,, | Performed by: UROLOGY

## 2024-09-30 RX ORDER — LIDOCAINE HYDROCHLORIDE 20 MG/ML
JELLY TOPICAL ONCE
Status: COMPLETED | OUTPATIENT
Start: 2024-09-30 | End: 2024-09-30

## 2024-09-30 RX ORDER — DOXYCYCLINE HYCLATE 100 MG
100 TABLET ORAL ONCE
Status: COMPLETED | OUTPATIENT
Start: 2024-09-30 | End: 2024-09-30

## 2024-09-30 RX ADMIN — Medication 100 MG: at 02:09

## 2024-09-30 RX ADMIN — LIDOCAINE HYDROCHLORIDE: 20 JELLY TOPICAL at 02:09

## 2024-09-30 NOTE — PROCEDURES
Procedures    CYSTOSCOPY REPORT    Pre Procedure Diagnosis:  dysuria    Post Procedure Diagnosis:  small stricture, was able to be dilated with the 14 Fr flexible cystoscope    Anesthesia: 10 cc 2% lidocaine jelly applied per urethra.    14 FR Flexible Olympus cystoscope used.    FINDINGS:  Dome, anterior, posterior, lateral walls and bladder base free of urothelial abnormalities. Right and left ureteral orifices in the normal postion and configuration, both effluxed clear urine.  Bladder neck and urethra were normal.  Upon entering the urinary tract with the scope, there was a popping sensation that she had a stricture.     Specimen:  none    The patient was taken to the cystoscopy suite and placed in dorsal lithotomy position.  The genitalia was prepped and draped  in the usual sterile fashion.  Time out was performed.  Two percent lidocaine jelly was inserted in the urethra.  After sufficent time had passed to allow good local anesthesia, the cystoscope was inserted in the urethra and passed into the bladder visualizing the urethra along its entire course.  The dome, anterior, posterior and lateral walls were examined systematically.  The ureteral orifices were in their usual position and configuration.  The cystoscope was turned upon itself 180 degrees to visualize the bladder neck.  The cystoscope was then brought to the level of the bladder neck, the water was turned on and the urethra was visualized.  The cystoscope was removed and the patient was instructed to urinate prior to leaving the office.     Post procedure medication:  doxycycline 100 mg x 1     ASSESSMENT/PLAN:  34 year old woman status post flexible cystoscopy.  1. Push fluids for 24 hours.  2. May see blood in the urine, this should gradually improve over the next 2-3 days.  3. The patient was instructed to return to the office or go to the emergency should fever, chills, cloudy urine, or inability to urinate develop.  4. Follow up prn.  She may  be someone who is dilated periodically.

## 2024-09-30 NOTE — PATIENT INSTRUCTIONS
What to Expect After a Cystoscopy  For the next 24-48 hours, you may feel a mild burning when you urinate. This burning is normal and expected. Drink plenty of water to dilute the urine to help relieve the burning sensation. You may also see a small amount of blood in your urine after the procedure.    Unless you are already taking antibiotics, you may be given an antibiotic after the test to prevent infection.    Signs and Symptoms to Report  Call the [facility] at [phone] if you develop any of the following:  Fever of 101 degrees or higher  Chills or persistent bleeding  Inability to urinate

## 2024-10-06 ENCOUNTER — PATIENT MESSAGE (OUTPATIENT)
Dept: UROLOGY | Facility: CLINIC | Age: 34
End: 2024-10-06
Payer: COMMERCIAL

## 2024-10-10 NOTE — TELEPHONE ENCOUNTER
----- Message from Kasandra sent at 10/10/2024  1:59 PM CDT -----  Regarding: Symptoms  Contact: Pt @[phone]  Pt is calling to speak to someone in the office to discuss symptoms they are having. Pt is asking for a call back today. Please call to advise. Thanks.         Symptoms: Pain during urination         How long has patient had these symptoms: 8-9 weeks

## 2024-10-14 ENCOUNTER — OFFICE VISIT (OUTPATIENT)
Dept: UROLOGY | Facility: CLINIC | Age: 34
End: 2024-10-14
Payer: COMMERCIAL

## 2024-10-14 DIAGNOSIS — R30.0 DYSURIA: Primary | ICD-10-CM

## 2024-10-14 PROCEDURE — 99214 OFFICE O/P EST MOD 30 MIN: CPT | Mod: 95,,, | Performed by: UROLOGY

## 2024-10-14 NOTE — PROGRESS NOTES
The patient location is: [place], [place]  The chief complaint leading to consultation is: ongoing dysuria    Visit type: audiovisual    Time with patient:  30 minutes of total time spent on the encounter, which includes face to face time and non-face to face time preparing to see the patient (eg, review of tests), obtaining and/or reviewing separately obtained history, documenting clinical information in the electronic or other health record, independently interpreting results (not separately reported) and communicating results to the patient/family/caregiver, or care coordination (not separately reported).     Each patient to whom he or she provides medical services by telemedicine is:  (1) informed of the relationship between the physician and patient and the respective role of any other health care provider with respect to management of the patient; and (2) notified that he or she may decline to receive medical services by telemedicine and may withdraw from such care at any time.      CHIEF COMPLAINT:    Mrs. Anguiano is a 34 y.o. female presenting for ongoing dysuria.    PRESENTING ILLNESS:    Isaias Anguiano is a 34 y.o. female who presents with a history of dysuria which started several weeks before she presented to the clinic.  A similar episode occurred 3 years ago.  The patient underwent urodynamics and cystoscopy and the symptoms resolved spontaneously.  She did well until she had the bladder infection recently.  She states that after the cystoscopy (in which she was found to have a small stricture which dilated with the scope)      Her stream is stronger but she has ongoing dysuria.  She has friends who have IC but their symptoms last days after a dietary indiscretion.  Her symptoms have been more persistent. She did note that symptoms worsened when she went for a girls weekend and drank lemon drops.  She does state when she worked with [facility], she was told that her muscles were tight. She went to [facility]  for incontinence, not for high tone PFT.  She is not on any hormones for birth control as she is status post tubal ligation.     REVIEW OF SYSTEMS:    Review of Systems   Constitutional: Negative.    HENT: Negative.     Eyes: Negative.    Respiratory: Negative.     Cardiovascular: Negative.    Gastrointestinal: Negative.    Genitourinary:  Positive for dysuria.   Musculoskeletal: Negative.    Skin: Negative.    Neurological: Negative.    Endo/Heme/Allergies: Negative.    Psychiatric/Behavioral: Negative.         PATIENT HISTORY:    No past medical history on file.    Past Surgical History:   Procedure Laterality Date     SECTION      TUBAL LIGATION      WRIST SURGERY         Family History   Problem Relation Name Age of Onset    Diabetes Mother      No Known Problems Father         Social History     Socioeconomic History    Marital status:    Tobacco Use    Smoking status: Never    Smokeless tobacco: Never   Substance and Sexual Activity    Alcohol use: Not Currently     Social Drivers of Health     Financial Resource Strain: Low Risk  (2024)    Overall Financial Resource Strain (CARDIA)     Difficulty of Paying Living Expenses: Not hard at all   Food Insecurity: No Food Insecurity (2024)    Hunger Vital Sign     Worried About Running Out of Food in the Last Year: Never true     Ran Out of Food in the Last Year: Never true   Physical Activity: Insufficiently Active (2024)    Exercise Vital Sign     Days of Exercise per Week: 3 days     Minutes of Exercise per Session: 30 min   Stress: No Stress Concern Present (2024)    [employer] [place] [employer] [employer] - Occupational Stress Questionnaire     Feeling of Stress : Only a little   Housing Stability: Unknown (2024)    Housing Stability Vital Sign     Unable to Pay for Housing in the Last Year: No       Allergies:  Patient has no known allergies.    Medications:  No outpatient encounter medications on file as of 10/14/2024.      No facility-administered encounter medications on file as of 10/14/2024.         PHYSICAL EXAMINATION:    The patient generally appears in good health, is appropriately interactive, and is in no apparent distress.    Mental: Cooperative with normal affect.    Chest:  normal inspiratory effort.    IMPRESSION:    dysuria    PLAN:    1.  She wanted to know what we could do   A.  Information for bladder irritants was provided per the AVS   B.  This could be high tone PFT.  She states she can call [facility] for recs.  If she needs a new Rx, let me know   C.  Since her stream is stronger, less likely to be an issue with scar formation or a stricture.  But I would do urodynamics if we had to go down this path.     D.  [facility]el Rx with coupon mailed   E.  She will update me.

## 2024-10-14 NOTE — PATIENT INSTRUCTIONS
Interstitial cystitis association www.ichelp.org  Interstitis cystitis network www.ic-network.com  ICN Food List (This is an nick from the Interstitial Cystitis Network that is available on iOS and Android.  Downloadable to your phone for easy access when going out or at the grocery store, to help make decisions on foods which may be bladder irritants)   ICN Bladder Tracker (Free Nick from the Interstitial Cystitis Network that tracks overall wellness, voiding symptoms, and other factors which come into play with IC.  This helps to guide therapy and assess response.)    1500 ml is the equiv three 16.9 oz bottles of water.

## 2024-10-18 ENCOUNTER — TELEPHONE (OUTPATIENT)
Dept: UROLOGY | Facility: CLINIC | Age: 34
End: 2024-10-18
Payer: COMMERCIAL

## 2024-10-18 DIAGNOSIS — R30.0 DYSURIA: Primary | ICD-10-CM

## 2024-10-18 RX ORDER — METHENAMINE, BENZOIC ACID, PHENYL SALICYLATE, METHYLENE BLUE, AND HYOSCYAMINE SULFATE 9; .12; 81.6; 10.8; 36.2 MG/1; MG/1; MG/1; MG/1; MG/1
1 TABLET, COATED ORAL EVERY 6 HOURS PRN
Qty: 120 EACH | Refills: 11 | Status: SHIPPED | OUTPATIENT
Start: 2024-10-18

## 2025-01-15 ENCOUNTER — CLINICAL SUPPORT (OUTPATIENT)
Dept: REHABILITATION | Facility: HOSPITAL | Age: 35
End: 2025-01-15
Payer: COMMERCIAL

## 2025-01-15 DIAGNOSIS — N39.3 SUI (STRESS URINARY INCONTINENCE, FEMALE): Primary | ICD-10-CM

## 2025-01-15 DIAGNOSIS — R53.1 WEAKNESS: ICD-10-CM

## 2025-01-15 DIAGNOSIS — R27.8 COORDINATION ABNORMAL: ICD-10-CM

## 2025-01-15 PROCEDURE — 97530 THERAPEUTIC ACTIVITIES: CPT | Mod: PN | Performed by: PHYSICAL THERAPIST

## 2025-01-15 PROCEDURE — 97161 PT EVAL LOW COMPLEX 20 MIN: CPT | Mod: PN | Performed by: PHYSICAL THERAPIST

## 2025-01-15 NOTE — PLAN OF CARE
"[facility]   Physical Therapy Initial Evaluation     Date: 1/15/2025   Name: Isaias Anguiano  Clinic Number: [id number]    Therapy Diagnosis:   Encounter Diagnoses   Name Primary?    TRICE (stress urinary incontinence, female) Yes    Coordination abnormal     Weakness      Physician: Vamsi Siu PT    Physician Orders: PT Eval and Treat PF PT  Medical Diagnosis from Referral: Pelvic floor dysfunction    Evaluation Date: 1/15/2025  Authorization Period Expiration: 2025  Plan of Care Expiration: 2025  Progress Note Due: 2025  Visit #  Visits authorized:    FOTO: 1/3    Precautions: Standard     Time In: 1:57 PM   Time Out: 2:36 PM   Total Appointment Time (timed & untimed codes): 39 minutes    HISTORY      Isaias is a 34 y.o. female evaluated on 01/15/2025    Physician:  Vmasi Siu, PT   Diagnosis:   Encounter Diagnoses   Name Primary?    TRICE (stress urinary incontinence, female) Yes    Coordination abnormal     Weakness       Treatment ordered: Physical Therapy  Medical History: No past medical history on file.   Surgical History:   Past Surgical History:   Procedure Laterality Date     SECTION      TUBAL LIGATION      WRIST SURGERY        Medications:   Current Outpatient Medications   Medication Sig    methen-hyos-me blue-ba-salicyl (URIBEL TABS) 81.6-0.12-10.8 mg Tab Take 1 tablet by mouth every 6 (six) hours as needed (dysuria, urgency).     No current facility-administered medications for this visit.       Allergies:   Review of patient's allergies indicates:  No Known Allergies     Precautions: universal    OB/GYN History:  childbirth vaginal delivery - 1 vaginal birth, 2 c-sections     Bladder/Bowel History: trouble initiating urine stream and recurrent bladder infections      SUBJECTIVE     Date of onset: 2024    History of current complaint: 2024 had a "UTI" so went to urgent care. Got medication and it did not work. Went back to " urgent care, got another medication that did not work. She eventually got to Dr. Rouse. She is still having pain 6 months later - feels like she has UTI, but she does not have a UTI. Some days are worse than others. She did do a cystoscope and urodynamics. Never diagnosed with IC. States that it is burning to urinate every time she pees, does not know if she has a UTI or not. Last week, her pain got worse and her urine stream was slow and increased frequency. Still with slow urine stream. Having to urinate, wait, then urinates again. Had some resistance on her urethra with the cystoscope. She was given Uribel and it made her entire body swell. Tried AZO and it does not work.     UI from the past is improved.     Urgency has increased since last week. Bowels are moving well. Has had trouble emptying her bladder that improves after cystoscope.     Patient's goals for therapy: To improve pelvic pain     Pain: Patient reports 0/10, with 0 being the lowest and 10 being the highest.    Activities that cause symptoms: strong urge to go and voiding    Previous treatment included PF physical therapist     Sexually active? 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    OBJECTIVE     ORTHO SCREEN  Posture: WNL  Pelvic alignment: not assessed    VAGINAL PELVIC FLOOR EXAM    EXTERNAL ASSESSMENT  Introitus: WNL  Skin condition: WNL  Scarring: none   Sensation: WNL   Pain:  none  Voluntary contraction: minimal  Voluntary relaxation: minimal  Perineal descent: absent      INTERNAL ASSESSMENT  Pain: tender areas noted as follows: bilateral levator ani and just lateral to the urethra bilaterally    Sensation: WNL  Vaginal vault: WNL   Muscle Bulk: hypertonus improved since last bout of physical therapy about a year ago  Muscle Power: 2/5     Quality of contraction: incomplete relaxation   Comments: limited Kegel, tenderness to  palpation just lateral to the urethra, Mild tenderness to palpation bilateral levator ani  recommended pelvic wand for home use     TREATMENT    Isaias participated in dynamic functional therapeutic activities to improve functional performance for 10  minutes, including:  Educated on use of a pelvic wand to assist with pelvic floor mobility. Educated on pelvic floor down training with bowel and bladder evacuation. Encouraged diaphragmatic breathing and stress management.     Education: instructed on general anatomy/physiology of urinary/bowel system; discussed plan of care with patient and parent/guardian; instructed in benefits/risks of treatment; instructed in alternative methods of treatment; instructed in risks of refusing treatment; patient a parent agreed to treatment plan.     Also educated in: anatomy/physiology of pelvic floor, posture/body mechanices, and fluid intake/dietary modifications    ASSESSMENT      This is a 34 y.o. female referred to outpatient physical therapy and presents with a medical diagnosis of N39.3 (ICD-10-CM) - TRICE (stress urinary incontinence, female). Patient will benefit from skilled physical therapy to improve bladder habits and urethral burning.    Educational/Spiritual/Cultural needs: none  Abuse/Neglect: no signs  Nutritional Status: WDWN   Fall Risk: patient is not a fall risk    Pt's spiritual, cultural and educational needs considered and pt agreeable to plan of care and goals as stated below:     Medical Necessity is demonstrated by the following:  History  Co-morbidities and personal factors that may impact the plan of care [x] LOW: no personal factors / co-morbidities  [] MODERATE: 1-2 personal factors / co-morbidities  [] HIGH: 3+ personal factors / co-morbidities    Moderate / High Support Documentation:   Co-morbidities affecting plan of care: none    Personal Factors:   no deficits     Examination  Body Structures and Functions, activity limitations and participation  restrictions that may impact the plan of care [x] LOW: addressing 1-2 elements  [] MODERATE: 3+ elements  [] HIGH: 4+ elements (please support below)    Moderate / High Support Documentation:      Clinical Presentation [x] LOW: stable  [] MODERATE: Evolving  [] HIGH: Unstable     Decision Making/ Complexity Score: low           PLAN    Frequency: 1x per week  Duration: 12 weeks    Short Term Goals: 6 weeks   Patient will deny burning with urination.  Patient will be independent with use of pelvic wand.   Patient will be independent with pelvic floor relaxation to improve bowel and bladder evacuation.     Long Term Goals: 12 weeks   Patient will report urinating every 3-4 hours with strong urine stream.   Patient will deny nocturia.   Patient will deny pelvic pain.   Patient will demonstrate adequate pelvic floor coordination with contraction and relaxation on  rehabilitative ultrasound.      Physical therapy will include: therapeutic exercise, manual therapy, therapeutic activities, neuromuscular re-education, manual therapy, modalities PRN, gait training, and self-care education.     Therapist: Vamsi Siu PT  Board-certified Women's Health Clinical Specialist  1/15/2025

## 2025-01-15 NOTE — PATIENT INSTRUCTIONS
DIAPHRAGMATIC BREATHING     The diaphragm is a dome shaped muscle that forms the floor of the rib cage. It is the most efficient muscle for breathing and relaxation, although most people are not used to using the diaphragm. Diaphragmatic or belly breathing is an important technique to learn because it helps settle down or relax the autonomic nervous system. The correct use of diaphragmatic breathing can help to quiet brain activity resulting in the relaxation of all the muscles and organs of the body. This is accomplished by slow rhythmic breathing concentrated in the diaphragm muscle rather than the chest.    How to do proper relaxation breathing:    Start by lying on your back or reclining in a chair in a relaxed position. Place one hand on your chest and the other on your abdomen.  Relax your jaw by placing your tongue on the floor of your mouth and keeping your teeth slightly apart.   Take a deep breath in, letting the abdomen expand and rise while you keep your upper chest, neck and shoulders relaxed.   As you breathe out, allow your abdomen and chest to fall. Exhale completely.  It doesn't matter if you breathe in/out through your nose and/or mouth. Do whichever feels comfortable.  Remember to breathe slowly.  Do not force your breathing. Do not hold your breath.  Repeat for 5 minutes every day.           [email or web address]  - shop  - pelvic wand  - [email or web address]    - hold pressure on the side walls then gentle massage   - can use 3-7 days a week   - 5 min session

## 2025-03-25 ENCOUNTER — DOCUMENTATION ONLY (OUTPATIENT)
Dept: REHABILITATION | Facility: HOSPITAL | Age: 35
End: 2025-03-25
Payer: COMMERCIAL

## 2025-03-25 NOTE — PROGRESS NOTES
Pelvic Health Physical Therapy   Discharge Summary     Date: 1/15/2025   Name: Isaias Anguiano  [facility] Number: [id number]     Therapy Diagnosis:        Encounter Diagnoses   Name Primary?    TRICE (stress urinary incontinence, female) Yes    Coordination abnormal      Weakness        Physician: Vamsi Siu PT     Physician Orders: PT Eval and Treat PF PT  Medical Diagnosis from Referral: Pelvic floor dysfunction               Evaluation Date: 1/15/2025  Authorization Period Expiration: 12/31/2025  Plan of Care Expiration: 4/9/2025  Progress Note Due: 2/12/2025  Visit # 1/12 Visits authorized: 1/ 1   FOTO: 1/3      Assessment     Patient did not complete plan of care. Goals not met.     Short Term Goals: 6 weeks   Patient will deny burning with urination.  Patient will be independent with use of pelvic wand.   Patient will be independent with pelvic floor relaxation to improve bowel and bladder evacuation.      Long Term Goals: 12 weeks   Patient will report urinating every 3-4 hours with strong urine stream.   Patient will deny nocturia.   Patient will deny pelvic pain.   Patient will demonstrate adequate pelvic floor coordination with contraction and relaxation on  rehabilitative ultrasound.      Plan     Discharge physical therapy at this time.     Vamsi Siu, PT